# Patient Record
Sex: MALE | Race: OTHER
[De-identification: names, ages, dates, MRNs, and addresses within clinical notes are randomized per-mention and may not be internally consistent; named-entity substitution may affect disease eponyms.]

---

## 2020-05-23 VITALS — SYSTOLIC BLOOD PRESSURE: 121 MMHG | DIASTOLIC BLOOD PRESSURE: 71 MMHG

## 2020-09-29 ENCOUNTER — HOSPITAL ENCOUNTER (OUTPATIENT)
Dept: HOSPITAL 61 - ECHO | Age: 55
Discharge: HOME | End: 2020-09-29
Attending: INTERNAL MEDICINE
Payer: COMMERCIAL

## 2020-09-29 DIAGNOSIS — I70.203: ICD-10-CM

## 2020-09-29 DIAGNOSIS — Z89.612: ICD-10-CM

## 2020-09-29 DIAGNOSIS — I08.0: Primary | ICD-10-CM

## 2020-09-29 PROCEDURE — 93925 LOWER EXTREMITY STUDY: CPT

## 2020-09-29 PROCEDURE — 93306 TTE W/DOPPLER COMPLETE: CPT

## 2020-09-29 NOTE — RAD
MR#: [id number]

Account#: [id number]

Accession#: [id number]

Date of Study: 09/29/2020

Ordering Physician: TRACY BLAKELY, 

Referring Physician: TRACY BLAKELY, 

Tech: Dejan Stuart MBA, RDMS, RVT, RDCS, RTR





--------------- APPROVED REPORT --------------





Patient Location: OUT-PATIENT



Indications

PAD



VELOCITY AND DOPPLER WAVEFORM ANALYSIS

RIGHT cm/secWaveformSeverity LEFT cm/secWaveform Severity 

dCFA 106.0BiphasicdCFA 67.0Biphasic

Prof Fem Art. 74.0BiphasicProf Fem Art. 80.0Biphasic

Fem Art Prox. 98.0TriphasicFem Art Prox. 74.0Biphasic

Fem Art Mid. 102.0TriphasicFem Art Mid. 68.0Monophasic

Fem Art Dist. 98.0TriphasicFem Art Dist. 17.0Monophasic

Pop Art(Fossa) 84.0TriphasicPop Art(AK) 28.0Monophasic

PTA Prox. 29.0BiphasicPTA Prox. 

PTA Dist. 12.0MonophasicPTA Dist. 

REVA Prox. 71.0MonophasicATA Prox. 

DPA 55MonophasicDPA 



Image





Findings

Grayscale images demonstrate moderate diffuse plaque bilaterally in the lower extremity arterial vess
els.



On the right side there are mostly triphasic and biphasic waveforms from the common femoral artery to
 the popliteal segment.  Below the knee the peroneal artery is not visualized.  There are diminished 
but biphasic waveforms in the posterior tibial artery in the proximal segment.  The anterior tibial a
rtery velocities are within normal limits.  The waveforms below the knee are monophasic in nature.



On the left side no significant disease is noted from the common femoral artery to the proximal SFA. 
 Diminished velocities are noted in the SFA and popliteal artery and a monophasic wave pattern consis
tent with the prior below the knee amputation.



Critical Notification

Critical Value: No



<Conclusion>

1.  No significant high-grade stenosis identified on the right side above the knee.  The right perone
al artery is likely occluded.  Likely diffuse disease in the posterior tibial artery and no focal obs
truction noted in the anterior tibial artery on the left side.

2.  No significant disease noted in the left common femoral to SFA segment.  The patient has a prior 
left below the knee amputation



Signed by : Michael Tucker, 

Electronically Approved : 09/29/2020 12:56:56

## 2020-09-29 NOTE — CARD
MR#: [id number]

Account#: [id number]

Accession#: [id number]

Date of Study: 09/29/2020

Ordering Physician: TRACY BLAKELY, 

Referring Physician: TRACY BLAKELY, 

Tech: Mica Vernonaddison





--------------- APPROVED REPORT --------------





EXAM: Two-dimensional and M-mode echocardiogram with Doppler and color Doppler.



Other Information 

Quality : AverageHR: 76bpm



INDICATION

Cardiac Disease: CAD 



Surgery/Intervention

CABG: Date: 2018  Site: [facility]



RISK FACTORS

Hypertension 

Diabetes



2D DIMENSIONS 

RVDd3.4 (2.9-3.5cm)Left Atrium(2D)3.3 (1.6-4.0cm)

IVSd1.0 (0.7-1.1cm)Aortic Root(2D)3.3 (2.0-3.7cm)

LVDd4.6 (3.9-5.9cm)LVOT Diameter2.1 (1.8-2.4cm)

PWd1.0 (0.7-1.1cm)LVDs2.9 (2.5-4.0cm)

FS (%) 35.4 %SV61.8 ml

LVEF(%)64.9 (>50%)



Aortic Valve

AoV Peak Christophe.241.0cm/sAoV VTI48.6cm

AO Peak GR.23.2mmHgLVOT Peak Christophe.120.5cm/s

LVOT  VTI 26.52cmAO Mean GR.11mmHg

SIMON (VMAX)1.40qm5ATB   (VTI)1.97cm2



Mitral Valve

MV E Opvanujs984.9cm/sMV DECEL [id number]

MV A Sxytxupy983.6cm/sMV E Mean Gr.7mmHg

MV KQQ24izK/A  Ratio1.1

MVA (PHT)2.43cm2



TDI

E/Lateral E'23.3E/Medial E'47.5



Pulmonary Valve

PV Peak Ycqugbkg31.6cm/sPV Peak Grad.4mmHg



Tricuspid Valve

TR P. Njdvlqgr526hx/sRAP AFYIRRAK1dsAk

TR Peak Gr.87vnJaYJOY76vfKh



Pulmonary Vein

S1 Eyaiitxr81.8cm/sD2 Lfbajtdg04.7cm/s

PVa aoizpcsy230anrv



 LEFT VENTRICLE 

The left ventricle is normal size. There is normal left ventricular wall thickness. The left ventricu
lar systolic function is normal and the ejection fraction is within normal range. The Ejection Fracti
on is 50-55%. There is normal LV segmental wall motion. Transmitral Doppler flow pattern is Grade II-
pseudonormal filling dynamics.



 RIGHT VENTRICLE 

The right ventricle is normal size. There is normal right ventricular wall thickness. The right ventr
icular systolic function is normal.



 ATRIA 

The left atrium is borderline dilated. The right atrium size is normal. The interatrial septum is int
act with no evidence for an atrial septal defect or patent foramen ovale as noted on 2-D or Doppler i
maging.



 AORTIC VALVE 

The aortic valve is heavily calcified and displays decreased opening. Doppler and Color Flow revealed
 trace aortic regurgitation. Calculated aortic valve area is 2.14 cm2 with maximum pressure gradient 
of 25 mmHg and mean pressure gradient of 11 mmHg.



 MITRAL VALVE 

The mitral valve is moderately thickened and heavily calcified. There is no evidence of mitral valve 
prolapse. Mitral valve has a mean gradient of 7.1 mmHg. There is mild to moderate mitral valve stenos
is.



 TRICUSPID VALVE 

The tricuspid valve is normal in structure and function. Doppler and Color Flow revealed trace tricus
pid regurgitation with an estimated PAP of 36 mmHg. There is no tricuspid valve stenosis.



 PULMONIC VALVE 

The pulmonic valve is not well visualized. Doppler and Color Flow revealed no pulmonic valvular regur
gitation. There is no pulmonic valvular stenosis.



 GREAT VESSELS 

The aortic root is normal in size. The ascending aorta is normal in size. The IVC is normal in size a
nd collapses >50% with inspiration.



 PERICARDIAL EFFUSION 

There is no evidence of significant pericardial effusion.



Critical Notification

Critical Value: No



<Conclusion>

The left ventricular systolic function is normal and the ejection fraction is within normal range. Th
e Ejection Fraction is 50-55%.

There is normal LV segmental wall motion.

The aortic valve is heavily calcified and displays decreased opening.

Calculated aortic valve area is 2.14 cm2 with maximum pressure gradient of 25 mmHg and mean pressure 
gradient of 11 mmHg.

Mitral valve has a mean gradient of 7.1 mmHg.  There is mild to moderate mitral valve stenosis.



Signed by : Michael Tucker, 

Electronically Approved : 09/29/2020 11:54:18

## 2020-10-20 ENCOUNTER — HOSPITAL ENCOUNTER (EMERGENCY)
Dept: HOSPITAL 61 - ER | Age: 55
Discharge: HOME | End: 2020-10-20
Payer: COMMERCIAL

## 2020-10-20 VITALS
SYSTOLIC BLOOD PRESSURE: 109 MMHG | SYSTOLIC BLOOD PRESSURE: 109 MMHG | DIASTOLIC BLOOD PRESSURE: 59 MMHG | DIASTOLIC BLOOD PRESSURE: 59 MMHG

## 2020-10-20 VITALS — WEIGHT: 212.31 LBS | HEIGHT: 69 IN | BODY MASS INDEX: 31.44 KG/M2

## 2020-10-20 DIAGNOSIS — Z95.1: ICD-10-CM

## 2020-10-20 DIAGNOSIS — Z87.891: ICD-10-CM

## 2020-10-20 DIAGNOSIS — I25.2: ICD-10-CM

## 2020-10-20 DIAGNOSIS — E11.22: ICD-10-CM

## 2020-10-20 DIAGNOSIS — I12.0: ICD-10-CM

## 2020-10-20 DIAGNOSIS — E11.40: ICD-10-CM

## 2020-10-20 DIAGNOSIS — G89.29: ICD-10-CM

## 2020-10-20 DIAGNOSIS — N18.6: ICD-10-CM

## 2020-10-20 DIAGNOSIS — Z99.2: ICD-10-CM

## 2020-10-20 DIAGNOSIS — N48.89: Primary | ICD-10-CM

## 2020-10-20 PROCEDURE — 99281 EMR DPT VST MAYX REQ PHY/QHP: CPT

## 2020-10-20 NOTE — PHYS DOC
Past Medical History


Past Medical History:  Diabetes-Type II, Hypertension, MI, Renal Failure, Other


Additional Past Medical Histor:  MILD MI,ESRD,CHRONIC PAIN


Past Surgical History:  Coronary Bypass Surgery, Other


Additional Past Surgical Histo:  LT ARM DIALYSIS SHUNT,L lower  amputaion


Smoking Status:  Former Smoker


Alcohol Use:  None


Drug Use:  None





General Adult


EDM:


Chief Complaint:  PENIS PROBLEM





HPI:


HPI:





History obtained from patient.  Patient is a 55-year-old male with a history of 

diabetes who presents with chief complaint of glans penis discomfort.  He states

he has had this discomfort for the past week.  He states he was initially 

started on Econazole ointment proximate 1 week ago.  He states he was 

reevaluated 3 days ago and started on clotrimazole.  He states this is not 

helped his discomfort.  Denies any rash.  Denies any discharge.  Denies any 

dysuria.  Notes occasional discomfort to the dorsal aspect of the glans penis.  

Denies trauma or injury.  Eyes any rashes.  Denies testicular pain.  Is 

vomiting.  Eyes any history of STIs.  Does note that he still makes urine.  

Denies any urinary symptoms.  Denies swelling.  No other complaints.





Review of Systems:


Review of Systems:


Constitutional:   Denies fever or chills. []


Eyes:   Denies change in visual acuity. []


HENT:   Denies nasal congestion or sore throat. [] 


Respiratory:   Denies cough or shortness of breath. [] 


Cardiovascular:   Denies chest pain or edema. [] 


GI:   Denies abdominal pain, nausea, vomiting, bloody stools or diarrhea. [] 


: Positive for glans penis pain


Musculoskeletal:   Denies back pain or joint pain. [] 


Integument:   Denies rash. [] 


Neurologic:   Denies headache, focal weakness or sensory changes. [] 


Endocrine:   Denies polyuria or polydipsia. [] 


Lymphatic:  Denies swollen glands. [] 


Psychiatric:  Denies depression or anxiety. []





Heart Score:


Risk Factors:


Risk Factors:  DM, Current or recent (<one month) smoker, HTN, HLP, family 

history of CAD, obesity.


Risk Scores:


Score 0 - 3:  2.5% MACE over next 6 weeks - Discharge Home


Score 4 - 6:  20.3% MACE over next 6 weeks - Admit for Clinical Observation


Score 7 - 10:  72.7% MACE over next 6 weeks - Early Invasive Strategies





Allergies:


Allergies:





Allergies








Coded Allergies Type Severity Reaction Last Updated Verified


 


  No Known Drug Allergies    5/21/20 No











Physical Exam:


PE:





Constitutional: Well developed, well nourished, no acute distress, non-toxic 

appearance. []


HENT: Normocephalic, atraumatic, bilateral external ears normal, oropharynx 

moist, no oral exudates, nose normal. []


Eyes: PERRLA, EOMI, conjunctiva normal, no discharge. [] 


Neck: Normal range of motion, no tenderness, supple, no stridor. [] 


Cardiovascular:Heart rate regular rhythm, no murmur []


Lungs & Thorax:  Bilateral breath sounds clear to auscultation []


Abdomen:  soft, no tenderness, no masses, no pulsatile masses. [] 


: Uncircumcised.  No testicular tenderness palpation.  No signs of fungal rash

 appreciated.  No discharge noted.  No blood the external urethral meatus.  No 

reproducible tenderness.  Normal exam.


Skin: Warm, dry, no erythema, no rash. [] 


Back: No tenderness, no CVA tenderness. [] 


Extremities: No tenderness, no cyanosis, no clubbing, ROM intact, no edema. [] 


Neurologic: Alert and oriented X 3, normal motor function, normal sensory 

function, no focal deficits noted. []


Psychologic: Affect normal, judgement normal, mood normal. []





Current Patient Data:


Vital Signs:





                                   Vital Signs








  Date Time  Temp Pulse Resp B/P (MAP) Pulse Ox O2 Delivery O2 Flow Rate FiO2


 


10/20/20 15:37 98.4 72 20 109/59 (76) 97 Room Air  





 98.4       











EKG:


EKG:


[]





Radiology/Procedures:


Radiology/Procedures:


[]





Course & Med Decision Making:


Course & Med Decision Making


Pertinent Labs and Imaging studies reviewed. (See chart for details)





[] Patient is a well-appearing 55-year-old male who presents with chief 

complaint of penile discomfort.  Initial vital signs unremarkable.  Exam overall

 reassuring.  No signs of balanoposthitis.  No penile discharge appreciated.  No

 testicular pain.  Overall reassuring examination.  Urine sample was attempted 

to be obtained but patient was unable to provide sample given his history of 

ESRD.  At this time elicitation for emergent etiology.  He does have follow-up 

appoint with his primary care physician tomorrow.  Imaging and lab will be 

deferred.  Return precautions were discussed and understood.  Patient agreeable 

to this plan.  Instructed to continue to use his clotrimazole ointment at home. 

 Stable for discharge.





Dragon Disclaimer:


Dragon Disclaimer:


This electronic medical record was generated, in whole or in part, using a voice

 recognition dictation system.





Departure


Departure


Impression:  


   Primary Impression:  


   Penile pain


Disposition:  01 DC HOME SELF CARE/HOMELESS


Condition:  STABLE


Referrals:  


ARIC MATUTE MD (PCP)


Patient Instructions:  Balanitis, Balanitis and Foreskin Hygiene





Additional Instructions:  


Please follow-up with your primary care physician in the next 2 to 3 days.











GURU CESAR DO          Oct 20, 2020 17:20

## 2021-11-23 ENCOUNTER — HOSPITAL ENCOUNTER (OUTPATIENT)
Dept: HOSPITAL 61 - ECHO | Age: 56
End: 2021-11-23
Attending: INTERNAL MEDICINE
Payer: COMMERCIAL

## 2021-11-23 DIAGNOSIS — E11.9: ICD-10-CM

## 2021-11-23 DIAGNOSIS — E66.9: ICD-10-CM

## 2021-11-23 DIAGNOSIS — E78.5: ICD-10-CM

## 2021-11-23 DIAGNOSIS — I10: ICD-10-CM

## 2021-11-23 DIAGNOSIS — I73.9: ICD-10-CM

## 2021-11-23 DIAGNOSIS — I70.203: ICD-10-CM

## 2021-11-23 DIAGNOSIS — I25.10: ICD-10-CM

## 2021-11-23 DIAGNOSIS — I08.0: Primary | ICD-10-CM

## 2021-11-23 PROCEDURE — 93306 TTE W/DOPPLER COMPLETE: CPT

## 2021-11-23 PROCEDURE — 93925 LOWER EXTREMITY STUDY: CPT

## 2021-11-24 NOTE — CARD
MR#: [id number]

Account#: [id number]

Accession#: [id number]

Date of Study: 11/23/2021

Ordering Physician: TRACY BLAKELY, 

Referring Physician: TRACY BLAKELY 

Tech: Neli [facility]





--------------- APPROVED REPORT --------------





EXAM: Two-dimensional and M-mode echocardiogram with Doppler and color Doppler.



Other Information 

Quality : Technically LimitedHR: 70bpm

Rhythm : NSR



INDICATION

Cardiac Disease: CAD 



RISK FACTORS

Hypertension 

Obesity   

Hyperlipidemia

Diabetes



2D DIMENSIONS 

RVDd3.9 (2.9-3.5cm)Left Atrium(2D)3.7 (1.6-4.0cm)

IVSd1.1 (0.7-1.1cm)Aortic Root(2D)3.6 (2.0-3.7cm)

LVDd4.1 (3.9-5.9cm)LVOT Diameter2.2 (1.8-2.4cm)

PWd1.3 (0.7-1.1cm)LVDs2.8 (2.5-4.0cm)

FS (%) 31.7 %SV44.9 ml

LVEF(%)60.1 (>50%)



Aortic Valve

AoV Peak Christophe.248.0cm/sAoV VTI54.2cm

AO Peak GR.24.6mmHgLVOT Peak Christophe.103.2cm/s

AO Mean GR.13mmHgAVA (VMAX)1.62cm2



Mitral Valve

MV E Vnybmagg559.0cm/sMV E Peak Gr.10mmHg

MV DECEL [id number] A Yhessnxn330.1cm/s

MV E Mean Gr.5mmHgE/A  Ratio1.0



Pulmonary Valve

PV Peak Oxifvxst967.0cm/s



 LEFT VENTRICLE 

The left ventricle is normal size. There is mild concentric left ventricular hypertrophy. The left ve
ntricular systolic function is normal.  Estimated ejection fraction 55-60%. There is normal LV segmen
lindsay wall motion. Transmitral Doppler flow pattern is Grade II-pseudonormal filling dynamics.



 RIGHT VENTRICLE 

The right ventricle is normal size. There is normal right ventricular wall thickness. The right ventr
icular systolic function is normal.



 ATRIA 

The left atrium size is normal. The right atrium size is normal. The interatrial septum is intact wit
h no evidence for an atrial septal defect or patent foramen ovale as noted on 2-D or Doppler imaging.




 AORTIC VALVE 

The aortic valve is calcified and displays decreased opening. Doppler and Color Flow revealed no sign
ificant aortic regurgitation. There is mild valvular aortic stenosis.



 MITRAL VALVE 

The mitral valve is calcified and displays decreased opening. There is no evidence of mitral valve pr
olapse. There is mild to moderate mitral valve stenosis. Doppler and Color-flow revealed trace mitral
 regurgitation.



 TRICUSPID VALVE 

The tricuspid valve is normal in structure and function. Doppler and Color Flow revealed no tricuspid
 valve regurgitation noted. There is no tricuspid valve stenosis.



 PULMONIC VALVE 

The pulmonary valve is normal in structure and function. Doppler and Color Flow revealed no pulmonic 
valvular regurgitation.



 GREAT VESSELS 

The aortic root is normal in size. The ascending aorta is normal in size. The IVC is normal in size a
nd collapses >50% with inspiration.



 PERICARDIAL EFFUSION 

There is no evidence of significant pericardial effusion.



Critical Notification

Critical Value: No



<Conclusion>

The left ventricular systolic function is normal.  

Estimated ejection fraction 55-60%.

There is normal LV segmental wall motion.

Transmitral Doppler flow pattern is Grade II-pseudonormal filling dynamics.

Mild valvular aortic stenosis.

Mild to moderate mitral valve stenosis.

There is no evidence of significant pericardial effusion.



Signed by : Tracy Blakely, 

Electronically Approved : 11/24/2021 08:15:22

## 2021-11-24 NOTE — RAD
MR#: [id number]

Account#: [id number]

Accession#: [id number]

Date of Study: 11/23/2021

Ordering Physician: TRACY VINSON, 

Referring Physician: TRACY VINSON 

Tech: Ora Sparrow RVT, HOOD





--------------- APPROVED REPORT --------------





Patient Location: OUT-PATIENT



Indications

PAD



VELOCITY AND DOPPLER WAVEFORM ANALYSIS

RIGHT cm/secWaveformSeverity LEFT cm/secWaveform Severity 

dCFA 147.0TriphasicdCFA 67.0Monophasic

Prof Fem Art. 124.0BiphasicProf Fem Art. 79.0Monophasic

Fem Art Prox. 140.0TriphasicFem Art Prox. 114.0Monophasic

Fem Art Mid. 58.0TriphasicFem Art Mid. 37.0Monophasic

Fem Art Dist. 51.0TriphasicFem Art Dist. 12.0Monophasic

Pop Art(Fossa) 68.0TriphasicPop Art(AK) 10.0Monophasic

PTA Prox. 48.0MonophasicPTA Prox. 

PTA Dist. 29.0MonophasicPTA Dist. 

Per Art Dist.41.0MonophasicPer Art Dist.

REVA Prox. 37.0MonophasicATA Prox. 

DPA 96MonophasicDPA 



Findings

Grayscale images showed calcified moderate and diffuse atherosclerotic plaque in both lower extremity
 vessels.  Spectral waveform and color duplex analysis showed triphasic waveforms in the right lower 
extremity from common femoral to the popliteal artery.  Below the knee, there is three-vessel runoff 
with monophasic waveforms.  The right posterior tibial artery showed minimal flow probably from moder
ate diffuse disease.  No focal high-grade stenosis was noted.  On the left side, no significant steno
sis was noted from common femoral artery to the popliteal artery.  There were diminished velocities i
n the superficial femoral and popliteal arteries with monophasic wave forms consistent with prior bel
ow the knee amputation.



Critical Notification

Critical Value: No



<Conclusion>

No significant high-grade peripheral artery stenosis was noted bilaterally.  The right posterior tibi
al artery showed moderate diffuse disease.  Patient has known left below the knee amputation.



Signed by : Tracy Vinson, 

Electronically Approved : 11/24/2021 08:29:49

## 2022-02-07 ENCOUNTER — HOSPITAL ENCOUNTER (INPATIENT)
Dept: HOSPITAL 61 - ER | Age: 57
LOS: 1 days | Discharge: HOME | DRG: 391 | End: 2022-02-08
Attending: FAMILY MEDICINE | Admitting: FAMILY MEDICINE
Payer: COMMERCIAL

## 2022-02-07 VITALS — SYSTOLIC BLOOD PRESSURE: 103 MMHG | DIASTOLIC BLOOD PRESSURE: 51 MMHG

## 2022-02-07 VITALS — HEIGHT: 69 IN | WEIGHT: 219.14 LBS | BODY MASS INDEX: 32.46 KG/M2

## 2022-02-07 VITALS — DIASTOLIC BLOOD PRESSURE: 27 MMHG | SYSTOLIC BLOOD PRESSURE: 101 MMHG

## 2022-02-07 VITALS — DIASTOLIC BLOOD PRESSURE: 50 MMHG | SYSTOLIC BLOOD PRESSURE: 109 MMHG

## 2022-02-07 VITALS — DIASTOLIC BLOOD PRESSURE: 62 MMHG | SYSTOLIC BLOOD PRESSURE: 115 MMHG

## 2022-02-07 DIAGNOSIS — E11.51: ICD-10-CM

## 2022-02-07 DIAGNOSIS — I77.1: ICD-10-CM

## 2022-02-07 DIAGNOSIS — Z79.4: ICD-10-CM

## 2022-02-07 DIAGNOSIS — E11.22: ICD-10-CM

## 2022-02-07 DIAGNOSIS — D63.8: ICD-10-CM

## 2022-02-07 DIAGNOSIS — I25.10: ICD-10-CM

## 2022-02-07 DIAGNOSIS — Z87.891: ICD-10-CM

## 2022-02-07 DIAGNOSIS — M79.605: ICD-10-CM

## 2022-02-07 DIAGNOSIS — Z98.62: ICD-10-CM

## 2022-02-07 DIAGNOSIS — Z20.822: ICD-10-CM

## 2022-02-07 DIAGNOSIS — N18.6: ICD-10-CM

## 2022-02-07 DIAGNOSIS — Z95.1: ICD-10-CM

## 2022-02-07 DIAGNOSIS — Z99.2: ICD-10-CM

## 2022-02-07 DIAGNOSIS — E78.5: ICD-10-CM

## 2022-02-07 DIAGNOSIS — I42.9: ICD-10-CM

## 2022-02-07 DIAGNOSIS — Z79.82: ICD-10-CM

## 2022-02-07 DIAGNOSIS — I25.2: ICD-10-CM

## 2022-02-07 DIAGNOSIS — J44.9: ICD-10-CM

## 2022-02-07 DIAGNOSIS — Z89.512: ICD-10-CM

## 2022-02-07 DIAGNOSIS — Z82.49: ICD-10-CM

## 2022-02-07 DIAGNOSIS — Z83.3: ICD-10-CM

## 2022-02-07 DIAGNOSIS — I12.0: ICD-10-CM

## 2022-02-07 DIAGNOSIS — K21.9: Primary | ICD-10-CM

## 2022-02-07 DIAGNOSIS — E21.3: ICD-10-CM

## 2022-02-07 LAB
ALBUMIN SERPL-MCNC: 3.5 G/DL (ref 3.4–5)
ALBUMIN/GLOB SERPL: 0.8 {RATIO} (ref 1–1.7)
ALP SERPL-CCNC: 57 U/L (ref 46–116)
ALT SERPL-CCNC: 19 U/L (ref 16–63)
ANION GAP SERPL CALC-SCNC: 20 MMOL/L (ref 6–14)
APTT BLD: 33 SEC (ref 24–38)
AST SERPL-CCNC: 13 U/L (ref 15–37)
BASOPHILS # BLD AUTO: 0.1 X10^3/UL (ref 0–0.2)
BASOPHILS NFR BLD: 1 % (ref 0–3)
BILIRUB SERPL-MCNC: 0.2 MG/DL (ref 0.2–1)
BUN SERPL-MCNC: 67 MG/DL (ref 8–26)
BUN/CREAT SERPL: 8 (ref 6–20)
CALCIUM SERPL-MCNC: 8.6 MG/DL (ref 8.5–10.1)
CHLORIDE SERPL-SCNC: 97 MMOL/L (ref 98–107)
CO2 SERPL-SCNC: 23 MMOL/L (ref 21–32)
CREAT SERPL-MCNC: 8.1 MG/DL (ref 0.7–1.3)
CRP SERPL-MCNC: 8.8 MG/L (ref 0–3.3)
EOSINOPHIL NFR BLD: 0.3 X10^3/UL (ref 0–0.7)
EOSINOPHIL NFR BLD: 3 % (ref 0–3)
ERYTHROCYTE [DISTWIDTH] IN BLOOD BY AUTOMATED COUNT: 14.7 % (ref 11.5–14.5)
GFR SERPLBLD BASED ON 1.73 SQ M-ARVRAT: 6.9 ML/MIN
GLUCOSE SERPL-MCNC: 109 MG/DL (ref 70–99)
HCT VFR BLD CALC: 23.7 % (ref 39–53)
HGB BLD-MCNC: 7.8 G/DL (ref 13–17.5)
LYMPHOCYTES # BLD: 2.9 X10^3/UL (ref 1–4.8)
LYMPHOCYTES NFR BLD AUTO: 29 % (ref 24–48)
MAGNESIUM SERPL-MCNC: 2.2 MG/DL (ref 1.8–2.4)
MCH RBC QN AUTO: 31 PG (ref 25–35)
MCHC RBC AUTO-ENTMCNC: 33 G/DL (ref 31–37)
MCV RBC AUTO: 94 FL (ref 79–100)
MONO #: 0.9 X10^3/UL (ref 0–1.1)
MONOCYTES NFR BLD: 9 % (ref 0–9)
NEUT #: 5.9 X10^3/UL (ref 1.8–7.7)
NEUTROPHILS NFR BLD AUTO: 59 % (ref 31–73)
PLATELET # BLD AUTO: 217 X10^3/UL (ref 140–400)
POTASSIUM SERPL-SCNC: 4.1 MMOL/L (ref 3.5–5.1)
PROT SERPL-MCNC: 7.8 G/DL (ref 6.4–8.2)
PROTHROMBIN TIME: 13 SEC (ref 11.7–14)
RBC # BLD AUTO: 2.52 X10^6/UL (ref 4.3–5.7)
SODIUM SERPL-SCNC: 140 MMOL/L (ref 136–145)
WBC # BLD AUTO: 10 X10^3/UL (ref 4–11)

## 2022-02-07 PROCEDURE — 87426 SARSCOV CORONAVIRUS AG IA: CPT

## 2022-02-07 PROCEDURE — 85651 RBC SED RATE NONAUTOMATED: CPT

## 2022-02-07 PROCEDURE — 87340 HEPATITIS B SURFACE AG IA: CPT

## 2022-02-07 PROCEDURE — 80053 COMPREHEN METABOLIC PANEL: CPT

## 2022-02-07 PROCEDURE — 83880 ASSAY OF NATRIURETIC PEPTIDE: CPT

## 2022-02-07 PROCEDURE — 93005 ELECTROCARDIOGRAM TRACING: CPT

## 2022-02-07 PROCEDURE — 80048 BASIC METABOLIC PNL TOTAL CA: CPT

## 2022-02-07 PROCEDURE — 85025 COMPLETE CBC W/AUTO DIFF WBC: CPT

## 2022-02-07 PROCEDURE — 82962 GLUCOSE BLOOD TEST: CPT

## 2022-02-07 PROCEDURE — G0378 HOSPITAL OBSERVATION PER HR: HCPCS

## 2022-02-07 PROCEDURE — 75635 CT ANGIO ABDOMINAL ARTERIES: CPT

## 2022-02-07 PROCEDURE — 85610 PROTHROMBIN TIME: CPT

## 2022-02-07 PROCEDURE — 83735 ASSAY OF MAGNESIUM: CPT

## 2022-02-07 PROCEDURE — 85730 THROMBOPLASTIN TIME PARTIAL: CPT

## 2022-02-07 PROCEDURE — U0003 INFECTIOUS AGENT DETECTION BY NUCLEIC ACID (DNA OR RNA); SEVERE ACUTE RESPIRATORY SYNDROME CORONAVIRUS 2 (SARS-COV-2) (CORONAVIRUS DISEASE [COVID-19]), AMPLIFIED PROBE TECHNIQUE, MAKING USE OF HIGH THROUGHPUT TECHNOLOGIES AS DESCRIBED BY CMS-2020-01-R: HCPCS

## 2022-02-07 PROCEDURE — 96374 THER/PROPH/DIAG INJ IV PUSH: CPT

## 2022-02-07 PROCEDURE — 73562 X-RAY EXAM OF KNEE 3: CPT

## 2022-02-07 PROCEDURE — 86140 C-REACTIVE PROTEIN: CPT

## 2022-02-07 PROCEDURE — 5A1D70Z PERFORMANCE OF URINARY FILTRATION, INTERMITTENT, LESS THAN 6 HOURS PER DAY: ICD-10-PCS | Performed by: FAMILY MEDICINE

## 2022-02-07 PROCEDURE — 93926 LOWER EXTREMITY STUDY: CPT

## 2022-02-07 PROCEDURE — 36415 COLL VENOUS BLD VENIPUNCTURE: CPT

## 2022-02-07 PROCEDURE — 84484 ASSAY OF TROPONIN QUANT: CPT

## 2022-02-07 PROCEDURE — 71045 X-RAY EXAM CHEST 1 VIEW: CPT

## 2022-02-07 PROCEDURE — 84145 PROCALCITONIN (PCT): CPT

## 2022-02-07 PROCEDURE — G0379 DIRECT REFER HOSPITAL OBSERV: HCPCS

## 2022-02-07 PROCEDURE — 93971 EXTREMITY STUDY: CPT

## 2022-02-07 RX ADMIN — OXYCODONE HYDROCHLORIDE AND ACETAMINOPHEN SCH TAB: 10; 325 TABLET ORAL at 21:36

## 2022-02-07 RX ADMIN — GABAPENTIN SCH MG: 300 CAPSULE ORAL at 21:35

## 2022-02-07 NOTE — PHYS DOC
Past Medical History


Past Medical History:  Diabetes-Type II, Hypertension, MI, Renal Failure, Other


Additional Past Medical Histor:  PERIPHERAL NEUROPATHY, CARDIOMYOPATHY, PVD, 

ULCERS, DIALYSIS MWF


Past Surgical History:  Coronary Bypass Surgery


Additional Past Surgical Histo:  L BKA, STENT R LEG, AV SHUNT R FA,


Smoking Status:  Former Smoker


Alcohol Use:  None


Drug Use:  None





Adult General


Chief Complaint


Chief Complaint:  CHEST PAIN





HPI


HPI


The patient is a 56-year-old comorbid male with a history of hypertension, 

hyperlipidemia, coronary artery disease status post CABG, peripheral vascular 

disease status post left BKA, insulin-dependent diabetes, end-stage renal 

disease on hemodialysis MWF (last dialyzed Friday, three days ago, on schedule).

 He presents for evaluation of 2 concerns.





First, patient notes left leg pain at his BKA site with onset 4-5 days ago.  

Discomfort was initially present only with bearing weight or with ambulating but

overnight became more constant and has been present at rest as well.  He states 

he has been able to ambulate with his prosthesis despite the leg discomfort.





Second, patient notes midsternal nonradiating chest discomfort with onset at 

about 5 AM this morning, about 1.5 hours prior to arrival.  Discomfort is 

characterized as constant, nonexertional and nonpleuritic and "like heartburn." 

Severity about 6 out of 10 at present.





Patient denies associated fevers, nausea or vomiting, upper respiratory con

gestion/rhinorrhea, cough, sore throat, shortness of breath, abdominal pain of 

any kind, flank pain, midline back pain, dysuria, hematuria, polyuria or 

oliguria, changes in bowel habits.  Vital signs are appropriate here and the 

patient is in no acute distress.  Triage EKG is nonischemic.





Review of Systems


Review of Systems


A 12 point review of systems was completed and was negative except for noted in 

HPI above.





Current Medications


Current Medications





Current Medications








 Medications


  (Trade)  Dose


 Ordered  Sig/Yisel  Start Time


 Stop Time Status Last Admin


Dose Admin


 


 Aspirin


  (Myles Aspirin)  325 mg  1X  ONCE  2/7/22 07:00


 2/7/22 07:01 DC 2/7/22 07:20


325 MG


 


 Fentanyl Citrate


  (Fentanyl 2ml


 Vial)  50 mcg  1X  ONCE  2/7/22 07:00


 2/7/22 07:01 DC 2/7/22 07:21


50 MCG











Allergies


Allergies





Allergies








Coded Allergies Type Severity Reaction Last Updated Verified


 


  No Known Drug Allergies    5/21/20 No











Physical Exam


Physical Exam


Older male appearing nontoxic and in no acute distress.  Head is normocephalic 

and atraumatic.  Neck is supple and nontender.  Oropharynx is moist.  Lungs are 

clear to auscultation at all stations.  There is a normal S1 and S2 without rubs

or gallops and capillary refill is appropriate, less than 2 seconds globally.  

There is a loud systolic ejection murmur which patient states is not new.  

Abdomen is soft, nontender and nondistended without pulsatile mass.  Skin is 

warm and dry without cyanosis, clubbing or edema.  Psychiatrically, the patient 

demonstrates appropriate mood and affect and is alert.  Evaluation of the 

extremities reveals BUEs and BLEs neurovascularly intact distally with strength 

5 out of 5, sensation intact light touch in all nerve distributions, radial, DP 

and PT pulses 2+ and equal bilaterally, capillary refill less than 2 seconds, 

hands and foot warm and well-perfused.  No dependent peripheral edema distally. 

No calf tenderness or swelling bilaterally.  Homans test is negative 

bilaterally.  Left below-knee amputation with very mild erythema and warmth and 

tenderness noted to the most distal aspect of the stump site and extending up 

slightly from there almost to the level of the knee both anteriorly and 

posteriorly.





Current Patient Data


Vital Signs





                                   Vital Signs








  Date Time  Temp Pulse Resp B/P (MAP) Pulse Ox O2 Delivery O2 Flow Rate FiO2


 


2/7/22 07:21   19  96 Room Air  


 


2/7/22 06:33 98.4 85  141/72 (95)    





 98.4       








Lab Values





                                Laboratory Tests








Test


 2/7/22


06:42


 


White Blood Count


 10.0 x10^3/uL


(4.0-11.0)


 


Red Blood Count


 2.52 x10^6/uL


(4.30-5.70)  L


 


Hemoglobin


 7.8 g/dL


(13.0-17.5)  L


 


Hematocrit


 23.7 %


(39.0-53.0)  L


 


Mean Corpuscular Volume


 94 fL ()





 


Mean Corpuscular Hemoglobin 31 pg (25-35)  


 


Mean Corpuscular Hemoglobin


Concent 33 g/dL


(31-37)


 


Red Cell Distribution Width


 14.7 %


(11.5-14.5)  H


 


Platelet Count


 217 x10^3/uL


(140-400)


 


Neutrophils (%) (Auto) 59 % (31-73)  


 


Lymphocytes (%) (Auto) 29 % (24-48)  


 


Monocytes (%) (Auto) 9 % (0-9)  


 


Eosinophils (%) (Auto) 3 % (0-3)  


 


Basophils (%) (Auto) 1 % (0-3)  


 


Neutrophils # (Auto)


 5.9 x10^3/uL


(1.8-7.7)


 


Lymphocytes # (Auto)


 2.9 x10^3/uL


(1.0-4.8)


 


Monocytes # (Auto)


 0.9 x10^3/uL


(0.0-1.1)


 


Eosinophils # (Auto)


 0.3 x10^3/uL


(0.0-0.7)


 


Basophils # (Auto)


 0.1 x10^3/uL


(0.0-0.2)


 


Erythrocyte Sedimentation Rate 67 (0-15)  H


 


Prothrombin Time


 13.0 SEC


(11.7-14.0)


 


Prothrombin Time INR 1.0 (0.8-1.1)  


 


Activated Partial


Thromboplast Time 33 SEC (24-38)





 


Sodium Level


 140 mmol/L


(136-145)


 


Potassium Level


 4.1 mmol/L


(3.5-5.1)


 


Chloride Level


 97 mmol/L


()  L


 


Carbon Dioxide Level


 23 mmol/L


(21-32)


 


Anion Gap 20 (6-14)  H


 


Blood Urea Nitrogen


 67 mg/dL


(8-26)  H


 


Creatinine


 8.1 mg/dL


(0.7-1.3)  H


 


Estimated GFR


(Cockcroft-Gault) 6.9  





 


BUN/Creatinine Ratio 8 (6-20)  


 


Glucose Level


 109 mg/dL


(70-99)  H


 


Calcium Level


 8.6 mg/dL


(8.5-10.1)


 


Magnesium Level


 2.2 mg/dL


(1.8-2.4)


 


Total Bilirubin


 0.2 mg/dL


(0.2-1.0)


 


Aspartate Amino Transferase


(AST) 13 U/L (15-37)


L


 


Alanine Aminotransferase (ALT)


 19 U/L (16-63)





 


Alkaline Phosphatase


 57 U/L


()


 


Troponin I High Sensitivity 9 ng/L (4-75)  


 


C-Reactive Protein,


Quantitative 8.8 mg/L


(0-3.3)  H


 


NT-Pro-B-Type Natriuretic


Peptide 4558 pg/mL


(0-124)  H


 


Total Protein


 7.8 g/dL


(6.4-8.2)


 


Albumin


 3.5 g/dL


(3.4-5.0)


 


Albumin/Globulin Ratio


 0.8 (1.0-1.7)


L


 


Procalcitonin


 0.41 ng/mL


(0.00-0.10)  H





                                Laboratory Tests


2/7/22 06:42








                                Laboratory Tests


2/7/22 06:42











EKG


EKG


Sinus rhythm, rate 80, no acute ST elevation or depression, , QRS 94, QTc 

444, EP interpretation.  Nonischemic tracing, intervals appropriate.





Radiology/Procedures


Radiology/Procedures


[]





Course & Med Decision Making


Course & Med Decision Making


56-year-old gentleman presenting for evaluation of left lower extremity stump 

discomfort with onset a few days ago, now with some chest discomfort 

midsternally as well.  Will place IV and give medication for discomfort as well 

as a full-strength aspirin as noted and will check labs, EKG and chest x-ray and

 will then reevaluate.  We will further assess for causes of acute leg pain with

 Doppler studies as noted.  Patient is due for dialysis today and in fact missed

 his session to come here.  Anticipate the need for admission, for multiple 

reasons.  This is discussed with the patient who is in agreement.





0814: On reassessment patient's left lower extremity stump remains tender but is

 no longer erythematous; suspect that there was some irritation from his 

prosthesis at the contact site when his leg was initially evaluated.  No 

evidence of cellulitis or other acute infectious process by labs, clinical exam 

or imaging upon reassessment.  Will defer antibiotics for now.  Workup as above;

 EKG and initial HS-trop negative.  Patient appears somewhat uremic but is due 

for dialysis today.  Electrolytes look okay.  Doppler studies notable for 

monophasic waveforms to the distal LLE; will order CT angiography with runoff to

 further characterize.  Patient's presenting LLE discomfort is suspicious, in 

context, for rest pain.  Plan for patient to dialyze shortly after CT angiogr

aphy is completed.  Will bring in for further care under Dr. Bowie (covering for 

Dr. Goff), who graciously accepts.  Nephrology consulted for assistance with 

dialysis today.





Dragon Disclaimer


Dragon Disclaimer


This electronic medical record was generated, in whole or in part, using a voice

 recognition dictation system.





Departure


Departure


Impression:  


   Primary Impression:  


   Other chest pain


   Additional Impressions:  


   Acute pain of left lower extremity


   Uremia


Disposition:  09 ADMITTED AS INPATIENT


Condition:  STABLE


Referrals:  


ARIC GOFF MD (PCP)





Problem Qualifiers











CHANTELLE TRENT MD                 Feb 7, 2022 07:09

## 2022-02-07 NOTE — RAD
3 views of the left knee dated 2/7/2022.



COMPARISON: None.



INDICATION: Chest pain.



FINDINGS:



3 views of left knee show evidence of prior below-knee amputation. No periostitis or bone destruction
. Mild tricompartmental hypertrophic changes of the knee. No joint effusion or loose body. Diffuse va
scular calcinosis.



IMPRESSION:

1. No acute radiographic abnormality.

2. Status post below-knee amputation.

3. Mild tricompartmental DJD with vascular calcinosis.



Electronically signed by: Jim Patricio MD (2/7/2022 7:29 AM) AMY

## 2022-02-07 NOTE — RAD
Single view chest dated 2/7/2022 7:27 AM:



COMPARISON: 5/21/2020



Clinical Indication: Chest pain.



Findings:



Single upright portable exam of the chest was performed. Heart and mediastinal contours are stable. P
atient is status post median sternotomy. Lungs are clear. No consolidation or pleural effusion. No pn
eumothorax.



IMPRESSION:



No acute radiographic abnormality.



Electronically signed by: Jim Patricio MD (2/7/2022 7:28 AM) AMY

## 2022-02-07 NOTE — RAD
US LEFT LOWER EXTREMITY ARTERIAL DUPLEX EVAL



Indication: Reason: acute left leg pain @ BKA site / Spl. Instructions:  / History:  



Comparison: None.



Procedure: Real-time grayscale, color flow Doppler, and Doppler spectral waveform analysis of the art
erial system of the lower extremity is performed.



Findings: 

Left below the knee amputation. Biphasic waveforms throughout the remainder of the left lower extremi
ty. No significant velocity elevation. Mild atheromatous plaque. Decreased flow within the left dista
l superficial femoral artery. No evidence of pseudoaneurysm within the imaged arteries.



IMPRESSION:

1.  Left below-the-knee amputation.

2.  Monophasic waveforms within the remainder of the left lower extremity, may indicate proximal sten
osis.

3.  Decreased velocity within the left distal superficial femoral artery.



Electronically signed by: Bean Carver DO (2/7/2022 7:59 AM) [id number]

## 2022-02-07 NOTE — RAD
US DPLX VENOUS EXTREMITY LOWER LT



History: Reason: acute left leg pain @ BKA site / Spl. Instructions:  / History: 



Comparison:  None.



Technique: Multiple longitudinal and transverse high resolution real-time images of the venous system
 of left lower extremity were obtained with color and Doppler sampling. 



Findings: 

Left below-the-knee amputation. Patent left common femoral, superficial femoral, deep femoral, poplit
eal and greater saphenous vein.



Impression: 

1.  No evidence of deep vein thrombosis.



Electronically signed by: Bean Carver DO (2/7/2022 7:57 AM) [id number]

## 2022-02-07 NOTE — PDOC2
CONSULT


Date of Consult


Date of Consult


DATE: 2/7/22 


TIME: 16:41





Reason for Consult


Reason for Consult:


ESRD





Referring Physician


Referring Physician:


MARSH





Identification/Chief Complaint


Chief Complaint


CHEST PAIN AND STUMP PAIN





Source


Source:  Chart review





History of Present Illness


Reason for Visit:


THIS IS A 56 YR OLD ESRD PT WITH CHEST PAIN AND LEFT BKA STUMP PAIN. UNDERGOING 

EVALUATION BY CARDIOLOGY AND VASCULAR SURGERY. HE HAS OP HD ON MWF AND HAS A 

LEFT FA AVF FOR ACCESS. ESRD IS DUE TO DM II.





Past Medical History


Cardiovascular:  CAD, HTN, Hyperlipidemia, Other (PAD s/p PTA RLE and left BKA)


Pulmonary:  COPD


GI:  Constipation


Heme/Onc:  Anemia NOS


Hepatobiliary:  No pertinent hx


Psych:  No pertinent hx


Rheumatologic:  No pertinent hx


Infectious disease:  No pertinent hx


Renal/:  Chronic renal failure (ESRD on HD )


Endocrine:  Diabetes





Past Surgical History


Past Surgical History:  CABG





Family History


Family History:  Diabetes, Hypertension





Social History


No


ALCOHOL:  none


Drugs:  None


Lives:  with Family





Current Problem List


Problem List


Problems


Medical Problems:


(1) Acute pain of left lower extremity


Status: Acute  





(2) Other chest pain


Status: Acute  





(3) Uremia


Status: Acute  











Current Medications


Current Medications





Current Medications


Aspirin (Myles Aspirin) 325 mg 1X  ONCE PO  Last administered on 2/7/22at 07:20;

 Start 2/7/22 at 07:00;  Stop 2/7/22 at 07:01;  Status DC


Fentanyl Citrate (Fentanyl 2ml Vial) 50 mcg 1X  ONCE IVP  Last administered on 

2/7/22at 07:21;  Start 2/7/22 at 07:00;  Stop 2/7/22 at 07:01;  Status DC


Ondansetron HCl (Zofran) 4 mg PRN Q8HRS  PRN IVP NAUSEA/VOMITING;  Start 2/7/22 

at 08:45;  Stop 2/8/22 at 08:44


Fentanyl Citrate (Fentanyl 2ml Vial) 25 mcg PRN Q1HR  PRN IVP PAIN;  Start 

2/7/22 at 08:45;  Stop 2/8/22 at 08:44


Iohexol (Omnipaque 350 Mg/ml) 80 ml 1X  ONCE IV  Last administered on 2/7/22at 

09:00;  Start 2/7/22 at 09:00;  Stop 2/7/22 at 09:01;  Status DC


Info (CONTRAST GIVEN -- Rx MONITORING) 1 each PRN DAILY  PRN MC SEE COMMENTS;  

Start 2/7/22 at 09:00;  Stop 2/9/22 at 08:59


Sodium Chloride 1,000 ml @  1,000 mls/hr Q1H PRN IV hypotension;  Start 2/7/22 

at 11:45;  Stop 2/7/22 at 17:44


Albumin Human 200 ml @  200 mls/hr 1X PRN  PRN IV Hypotension;  Start 2/7/22 at 

11:45;  Stop 2/7/22 at 17:44


Sodium Chloride 1,000 ml @  400 mls/hr Q2H30M PRN IV PATENCY;  Start 2/7/22 at 

11:45;  Stop 2/7/22 at 23:44


Info (PHARMACY MONITORING -- do not chart) 1 each PRN DAILY  PRN MC SEE 

COMMENTS;  Start 2/7/22 at 11:45


Info (PHARMACY MONITORING -- do not chart) 1 each PRN DAILY  PRN MC SEE 

COMMENTS;  Start 2/7/22 at 11:45


Aspirin (Myles Aspirin) 325 mg DAILY PO ;  Start 2/8/22 at 09:00


Atorvastatin Calcium (Lipitor) 20 mg QHS PO ;  Start 2/7/22 at 21:00





Active Scripts


Active


Augmentin 875-125 Tablet (Amoxicillin/Potassium Clav) 1 Each Tablet 1 Tab PO BID

PRN 10 Days


Azithromycin Tablet (Azithromycin) 250 Mg Tablet 250 Mg PO DAILY 4 Days


Gabapentin ** (Gabapentin) 300 Mg Capsule 300 Mg PO DAILY


Reported


Furosemide 40 Mg Tablet 40  DAILY


Gabapentin 300 Mg Capsule 300  BID


Trazodone Hcl 50 Mg Tablet 50  HS


Pantoprazole Sodium 40 Mg Tablet.dr 40  DAILY


Renagel (Sevelamer Hcl) 800 Mg Tablet 800  TID


Oxycodone-Acetaminophen  (Oxycodone Hcl/Acetaminophen) 1 Each Tablet 10  

QID


Atorvastatin Calcium 20 Mg Tablet 20  DAILY


Amlodipine Besylate 2.5 Mg Tablet 2.5  DAILY


Aspirin 325 Mg Tablet 1 Tab PO DAILY


Alprazolam 0.5 Mg Tablet 1 Tab PO TID PRN PRN


Novolin N (Nph, Human Insulin Isophane) 100 Unit/1 Ml Vial 35 Units SQ HS


Novolin N (Nph, Human Insulin Isophane) 100 Unit/1 Ml Vial 60 Units SQ DAILY08





Allergies


Allergies:  


Coded Allergies:  


     No Known Drug Allergies (Unverified , 5/21/20)





ROS


General:  YES: Fatigue


PSYCHOLOGICAL ROS:  YES: Depression


Eyes:  Yes Decreased vision


ALLERGY AND IMMUNOLOGY:  YES: Hives


Cardiovascular:  yes Chest Pain


Gastrointestinal:  Yes Nausea


Genitourinary:  YES Other (ANURIA)


Musculoskeletal:  Yes Muscular Weakness, Yes Other (STUMP PAIN)


Neurological:  Yes Weakness





Physical Exam


General:  Alert, Cooperative


HEENT:  Atraumatic, PERRLA


Lungs:  Clear to auscultation


Heart:  Regular rate


Abdomen:  Normal bowel sounds, Soft, No tenderness


Skin:  No breakdown


Neuro:  Normal speech


Psych/Mental Status:  Mental status NL


MUSCULOSKELETAL:  No joint tenderness, No deformity





Vitals


VITALS





Vital Signs








  Date Time  Temp Pulse Resp B/P (MAP) Pulse Ox O2 Delivery O2 Flow Rate FiO2


 


2/7/22 16:14      Room Air  


 


2/7/22 14:38 97.6 74 18 101/27 (51) 93   





 97.6       











Labs


Labs





Laboratory Tests








Test


 2/7/22


06:42 2/7/22


08:46 2/7/22


13:51 2/7/22


14:00


 


White Blood Count


 10.0 x10^3/uL


(4.0-11.0) 


 


 





 


Red Blood Count


 2.52 x10^6/uL


(4.30-5.70) 


 


 





 


Hemoglobin


 7.8 g/dL


(13.0-17.5) 


 


 





 


Hematocrit


 23.7 %


(39.0-53.0) 


 


 





 


Mean Corpuscular Volume 94 fL ()    


 


Mean Corpuscular Hemoglobin 31 pg (25-35)    


 


Mean Corpuscular Hemoglobin


Concent 33 g/dL


(31-37) 


 


 





 


Red Cell Distribution Width


 14.7 %


(11.5-14.5) 


 


 





 


Platelet Count


 217 x10^3/uL


(140-400) 


 


 





 


Neutrophils (%) (Auto) 59 % (31-73)    


 


Lymphocytes (%) (Auto) 29 % (24-48)    


 


Monocytes (%) (Auto) 9 % (0-9)    


 


Eosinophils (%) (Auto) 3 % (0-3)    


 


Basophils (%) (Auto) 1 % (0-3)    


 


Neutrophils # (Auto)


 5.9 x10^3/uL


(1.8-7.7) 


 


 





 


Lymphocytes # (Auto)


 2.9 x10^3/uL


(1.0-4.8) 


 


 





 


Monocytes # (Auto)


 0.9 x10^3/uL


(0.0-1.1) 


 


 





 


Eosinophils # (Auto)


 0.3 x10^3/uL


(0.0-0.7) 


 


 





 


Basophils # (Auto)


 0.1 x10^3/uL


(0.0-0.2) 


 


 





 


Erythrocyte Sedimentation Rate 67 (0-15)    


 


Prothrombin Time


 13.0 SEC


(11.7-14.0) 


 


 





 


Prothromb Time International


Ratio 1.0 (0.8-1.1) 


 


 


 





 


Activated Partial


Thromboplast Time 33 SEC (24-38) 


 


 


 





 


Sodium Level


 140 mmol/L


(136-145) 


 


 





 


Potassium Level


 4.1 mmol/L


(3.5-5.1) 


 


 





 


Chloride Level


 97 mmol/L


() 


 


 





 


Carbon Dioxide Level


 23 mmol/L


(21-32) 


 


 





 


Anion Gap 20 (6-14)    


 


Blood Urea Nitrogen


 67 mg/dL


(8-26) 


 


 





 


Creatinine


 8.1 mg/dL


(0.7-1.3) 


 


 





 


Estimated GFR


(Cockcroft-Gault) 6.9 


 


 


 





 


BUN/Creatinine Ratio 8 (6-20)    


 


Glucose Level


 109 mg/dL


(70-99) 


 


 





 


Calcium Level


 8.6 mg/dL


(8.5-10.1) 


 


 





 


Magnesium Level


 2.2 mg/dL


(1.8-2.4) 


 


 





 


Total Bilirubin


 0.2 mg/dL


(0.2-1.0) 


 


 





 


Aspartate Amino Transf


(AST/SGOT) 13 U/L (15-37) 


 


 


 





 


Alanine Aminotransferase


(ALT/SGPT) 19 U/L (16-63) 


 


 


 





 


Alkaline Phosphatase


 57 U/L


() 


 


 





 


Troponin I High Sensitivity 9 ng/L (4-75)  11 ng/L (4-75)  11 ng/L (4-75)  


 


C-Reactive Protein,


Quantitative 8.8 mg/L


(0-3.3) 


 


 





 


NT-Pro-B-Type Natriuretic


Peptide 4558 pg/mL


(0-124) 


 


 





 


Total Protein


 7.8 g/dL


(6.4-8.2) 


 


 





 


Albumin


 3.5 g/dL


(3.4-5.0) 


 


 





 


Albumin/Globulin Ratio 0.8 (1.0-1.7)    


 


Procalcitonin


 0.41 ng/mL


(0.00-0.10) 


 


 





 


Hepatitis B Surface Antigen


 Nonreactive


(Nonreactive) 


 


 





 


SARS-CoV-2 Antigen (Rapid)


 


 


 


 Negative


(NEGATIVE)








Laboratory Tests








Test


 2/7/22


06:42 2/7/22


08:46 2/7/22


13:51 2/7/22


14:00


 


White Blood Count


 10.0 x10^3/uL


(4.0-11.0) 


 


 





 


Red Blood Count


 2.52 x10^6/uL


(4.30-5.70) 


 


 





 


Hemoglobin


 7.8 g/dL


(13.0-17.5) 


 


 





 


Hematocrit


 23.7 %


(39.0-53.0) 


 


 





 


Mean Corpuscular Volume 94 fL ()    


 


Mean Corpuscular Hemoglobin 31 pg (25-35)    


 


Mean Corpuscular Hemoglobin


Concent 33 g/dL


(31-37) 


 


 





 


Red Cell Distribution Width


 14.7 %


(11.5-14.5) 


 


 





 


Platelet Count


 217 x10^3/uL


(140-400) 


 


 





 


Neutrophils (%) (Auto) 59 % (31-73)    


 


Lymphocytes (%) (Auto) 29 % (24-48)    


 


Monocytes (%) (Auto) 9 % (0-9)    


 


Eosinophils (%) (Auto) 3 % (0-3)    


 


Basophils (%) (Auto) 1 % (0-3)    


 


Neutrophils # (Auto)


 5.9 x10^3/uL


(1.8-7.7) 


 


 





 


Lymphocytes # (Auto)


 2.9 x10^3/uL


(1.0-4.8) 


 


 





 


Monocytes # (Auto)


 0.9 x10^3/uL


(0.0-1.1) 


 


 





 


Eosinophils # (Auto)


 0.3 x10^3/uL


(0.0-0.7) 


 


 





 


Basophils # (Auto)


 0.1 x10^3/uL


(0.0-0.2) 


 


 





 


Erythrocyte Sedimentation Rate 67 (0-15)    


 


Prothrombin Time


 13.0 SEC


(11.7-14.0) 


 


 





 


Prothromb Time International


Ratio 1.0 (0.8-1.1) 


 


 


 





 


Activated Partial


Thromboplast Time 33 SEC (24-38) 


 


 


 





 


Sodium Level


 140 mmol/L


(136-145) 


 


 





 


Potassium Level


 4.1 mmol/L


(3.5-5.1) 


 


 





 


Chloride Level


 97 mmol/L


() 


 


 





 


Carbon Dioxide Level


 23 mmol/L


(21-32) 


 


 





 


Anion Gap 20 (6-14)    


 


Blood Urea Nitrogen


 67 mg/dL


(8-26) 


 


 





 


Creatinine


 8.1 mg/dL


(0.7-1.3) 


 


 





 


Estimated GFR


(Cockcroft-Gault) 6.9 


 


 


 





 


BUN/Creatinine Ratio 8 (6-20)    


 


Glucose Level


 109 mg/dL


(70-99) 


 


 





 


Calcium Level


 8.6 mg/dL


(8.5-10.1) 


 


 





 


Magnesium Level


 2.2 mg/dL


(1.8-2.4) 


 


 





 


Total Bilirubin


 0.2 mg/dL


(0.2-1.0) 


 


 





 


Aspartate Amino Transf


(AST/SGOT) 13 U/L (15-37) 


 


 


 





 


Alanine Aminotransferase


(ALT/SGPT) 19 U/L (16-63) 


 


 


 





 


Alkaline Phosphatase


 57 U/L


() 


 


 





 


Troponin I High Sensitivity 9 ng/L (4-75)  11 ng/L (4-75)  11 ng/L (4-75)  


 


C-Reactive Protein,


Quantitative 8.8 mg/L


(0-3.3) 


 


 





 


NT-Pro-B-Type Natriuretic


Peptide 4558 pg/mL


(0-124) 


 


 





 


Total Protein


 7.8 g/dL


(6.4-8.2) 


 


 





 


Albumin


 3.5 g/dL


(3.4-5.0) 


 


 





 


Albumin/Globulin Ratio 0.8 (1.0-1.7)    


 


Procalcitonin


 0.41 ng/mL


(0.00-0.10) 


 


 





 


Hepatitis B Surface Antigen


 Nonreactive


(Nonreactive) 


 


 





 


SARS-CoV-2 Antigen (Rapid)


 


 


 


 Negative


(NEGATIVE)











Assessment/Plan


Assessment/Plan


IMP





CHEST PAIN


ESRD MWF-L FA AVF


ANEMIA


LEFT BKA STUMP PAIN


DM II


HTN





PLAN





HD TODAY


UF TO TW


JOANNE AS NEEDED


CARDIOLOGY AND VASCULAR EVALUATION


WILL FOLLOW











RACHEAL GUERRERO MD                  Feb 7, 2022 16:46

## 2022-02-07 NOTE — RAD
CTA AORTA/RUNOFF W/WO +POST



History: Suspected rest pain of left lower extremity, vasculopathy, abnormal arterial Doppler.



Comparison: Lower extremity Doppler ultrasound 2/7/2022.



Technique: CT angiography of the abdomen and pelvis with runoff to the bilateral lower extremities wi
th intravenous contrast. 3-D postprocessing performed with rotating 3-D surface renderings. 



Findings:

The thoracic and abdominal aorta are normal caliber. There is moderate calcification of the abdominal
 aorta. The origins of the celiac, superior mesenteric the left renal arteries are widely patent. The
re is approximately 50 percent stenosis at the origin of the right renal artery and complete versus n
ear complete stenosis of the origin of the inferior mesenteric artery which iliac the origin of the e
ither imperceptible fluid the origin more likely collateral flow.



The common iliac, internal and external iliac arteries are patent with moderate to heavy atherosclero
tic calcification. Heavy atherosclerotic calcification of the bilateral common femoral arteries.



There is a severe stenosis at the origin of the right superficial femoral artery with approximately 1
.5 mm diameter lumen, approximately 75 percent stenosis. Enhancement is seen within the right SFA to 
the popliteal artery. At the trifurcation the vessels are heavily circumferentially calcified and lum
inal enhancement is not well perceived however limited significantly by the heavy peripheral calcific
ation.



The left superficial femoral artery demonstrates severe long segment stenosis with approximately 1.5 
mm lumen at the origin with heavy atherosclerosis along the entire course. Patent but atherosclerotic
 left deep femoral artery. Complete or near complete stenosis of the distal left superficial femoral 
artery at the level of the distal femoral metadiaphysis. Irregular enhancement of the popliteal arter
y and which trifurcates immediately prior to below-knee amputation. Luminal enhancement is seen in th
e left anterior tibial artery however not definitively seen in the short segments of the peroneal and
 posterior tibial arteries.



Dependent changes in the lung bases. Heavy coronary artery calcifications. The liver, gallbladder,, p
ancreas and spleen are unremarkable. There is a calcified right adrenal nodule. No hydronephrosis. Th
e bladder and prostate are unremarkable. The stomach, small bowel and colon are within normal limits.
 No adenopathy or free fluid. Bilateral L5 pars interarticularis defects. Mild degenerative changes i
n the spine.



Impression: 



1.  Severe long segment stenosis of the left superficial femoral artery with approximately 1.5 mm lum
en at the origin and complete or near complete stenosis at the distal left SFA at the level of the di
stal femoral metadiaphysis.

2.  Severe approximately 75 percent stenosis of the right superficial femoral artery at the origin. R
ight popliteal artery enhancement without definite enhancement seen within the right popliteal trifur
cation which is limited due to severe circumferential atherosclerotic calcifications of the trifurcat
ion vessels.

3.  Complete to near complete occlusion of the origin of the inferior mesenteric artery.

4.  Approximately 50 percent stenosis right renal artery at the origin.





------

Exposure: One or more of the following individualized dose reduction techniques were utilized for thi
s examination:  

1. Automated exposure control

2. Adjustment of the mA and/or kV according to patient size

3. Use of iterative reconstruction technique.



Electronically signed by: Yuri Dockery MD (2/7/2022 10:22 AM) [id number]

## 2022-02-07 NOTE — PDOC2
CONSULT


Date of Service


Date of Service


DATE: 2/7/22 


TIME: 10:58





Reason for Consult


Reason for Consult:


Rest pain to left BKA stump





Referring Physician


Referring Physician:


Dr. Iglesias





Identification/Chief Complaint


Chief Complaint


Left stump pain





Source


Source:  Patient





History of Present Illness


Reason for Visit:


This is a pleasant 56-year-old diabetic male with end-stage renal disease on 

hemodialysis who presents to the ED with left BKA stump pain.  He had his BKA 

several years ago, had 1 surgery and 2019 for incision and drainage on the stump

after a fall otherwise has had no issues with it and has been ambulating with a 

prosthesis.  He reports his pain started on Thursday and has progressively 

worsened.  It is triggered and worsened by using the prosthesis and 

improves/resolves when he lets it rest for periods of time.  He rates his pain 

is moderate.  Does not radiate anywhere.  He denies any recent falls. He did get

his prosthesis readjusted with new inserts about one month ago. He denies any 

fevers or chills.  He denies a smoking history.  He dialyzes Monday Wednesday Friday through left arm fistula placed in 2017. Denies any hand complaints or 

concerns with fistula. He had a CTA showing bilateral SFA disease. He had a 

plain xray of the stump without significant findings. He takes aspirin and stat

in at home.





Past Medical History


Cardiovascular:  CAD, HTN, Hyperlipidemia, Other


Pulmonary:  COPD


GI:  Constipation


Heme/Onc:  Anemia NOS


Hepatobiliary:  No pertinent hx


Psych:  No pertinent hx


Rheumatologic:  No pertinent hx


Infectious disease:  No pertinent hx


Renal/:  Chronic renal insuff


Endocrine:  Diabetes, Hyperparathyroidism





Past Surgical History


Past Surgical History


L BKA 


L BKA I & D 7/17/19


L arm AVF fistula side branch ligation 12/21/17


Past Surgical History:  Other





Family History


Family History:  Diabetes, Hypertension





Social History


Social History


Denies smoking


ALCOHOL:  none


Lives:  Alone





Current Problem List


Problem List


Problems


Medical Problems:


(1) Acute pain of left lower extremity


Status: Acute  





(2) Other chest pain


Status: Acute  





(3) Uremia


Status: Acute  











Current Medications


Current Medications





Current Medications


Aspirin (Myles Aspirin) 325 mg 1X  ONCE PO  Last administered on 2/7/22at 07:20;

 Start 2/7/22 at 07:00;  Stop 2/7/22 at 07:01;  Status DC


Fentanyl Citrate (Fentanyl 2ml Vial) 50 mcg 1X  ONCE IVP  Last administered on 

2/7/22at 07:21;  Start 2/7/22 at 07:00;  Stop 2/7/22 at 07:01;  Status DC


Ondansetron HCl (Zofran) 4 mg PRN Q8HRS  PRN IVP NAUSEA/VOMITING;  Start 2/7/22 

at 08:45;  Stop 2/8/22 at 08:44


Fentanyl Citrate (Fentanyl 2ml Vial) 25 mcg PRN Q1HR  PRN IVP PAIN;  Start 

2/7/22 at 08:45;  Stop 2/8/22 at 08:44


Iohexol (Omnipaque 350 Mg/ml) 80 ml 1X  ONCE IV  Last administered on 2/7/22at 

09:00;  Start 2/7/22 at 09:00;  Stop 2/7/22 at 09:01;  Status DC


Info (CONTRAST GIVEN -- Rx MONITORING) 1 each PRN DAILY  PRN MC SEE COMMENTS;  

Start 2/7/22 at 09:00;  Stop 2/9/22 at 08:59





Active Scripts


Active


Augmentin 875-125 Tablet (Amoxicillin/Potassium Clav) 1 Each Tablet 1 Tab PO BID

PRN 10 Days


Azithromycin Tablet (Azithromycin) 250 Mg Tablet 250 Mg PO DAILY 4 Days


Gabapentin ** (Gabapentin) 300 Mg Capsule 300 Mg PO DAILY


Reported


Furosemide 40 Mg Tablet 40  DAILY


Gabapentin 300 Mg Capsule 300  BID


Trazodone Hcl 50 Mg Tablet 50  HS


Pantoprazole Sodium 40 Mg Tablet.dr 40  DAILY


Renagel (Sevelamer Hcl) 800 Mg Tablet 800  TID


Oxycodone-Acetaminophen  (Oxycodone Hcl/Acetaminophen) 1 Each Tablet 10  

QID


Atorvastatin Calcium 20 Mg Tablet 20  DAILY


Amlodipine Besylate 2.5 Mg Tablet 2.5  DAILY


Aspirin 325 Mg Tablet 1 Tab PO DAILY


Alprazolam 0.5 Mg Tablet 1 Tab PO TID PRN PRN


Novolin N (Nph, Human Insulin Isophane) 100 Unit/1 Ml Vial 35 Units SQ HS


Novolin N (Nph, Human Insulin Isophane) 100 Unit/1 Ml Vial 60 Units SQ DAILY08





Allergies


Allergies:  


Coded Allergies:  


     No Known Drug Allergies (Unverified , 5/21/20)





ROS


General:  No: Chills, Other (fevers)


Respiratory:  No: Cough, Shortness of breath


Cardiovascular:  No Chest Pain, No Edema


Gastrointestinal:  No Nausea, No Vomiting, No Abdominal Pain


Musculoskeletal:  Yes Gait Disturbance, Yes Pain In: (L BKA)


Neurological:  No Numbness/Tingling, No Weakness


Skin:  Yes Dry Skin





Physical Exam


General:  Alert, Oriented X3, Cooperative, No acute distress


HEENT:  Atraumatic, EOMI


Lungs:  Normal air movement (room air)


Heart:  Regular rate ( 79)


Abdomen:  Soft, No tenderness


Extremities:  No cyanosis, No edema, Other (L arm AVF thrill, hand and fingers 

warm with excellent cap refill. Could not feel L radial pulse, however pt had 

saran wrap over arm/wrist for ointment absorption prior to dialysis pokes. L BKA

stump warm with good color, no swelling or wounds. )


Skin:  No rashes, No significant lesion, Other (L BKA healed scars, no wounds, 

no erythema, or concerns. )


Neuro:  Normal speech, Normal tone


Psych/Mental Status:  Mental status NL, Mood NL


MUSCULOSKELETAL:  No deformity, No swelling, No muscular tenderness noted, Full 

range of motion without pain





Vitals


VITALS





Vital Signs








  Date Time  Temp Pulse Resp B/P (MAP) Pulse Ox O2 Delivery O2 Flow Rate FiO2


 


2/7/22 08:38  70 13 109/56 (73) 94 Room Air  


 


2/7/22 06:33 98.4       





 98.4       











Labs


Labs





Laboratory Tests








Test


 2/7/22


06:42 2/7/22


08:46


 


White Blood Count


 10.0 x10^3/uL


(4.0-11.0) 





 


Red Blood Count


 2.52 x10^6/uL


(4.30-5.70) 





 


Hemoglobin


 7.8 g/dL


(13.0-17.5) 





 


Hematocrit


 23.7 %


(39.0-53.0) 





 


Mean Corpuscular Volume 94 fL ()  


 


Mean Corpuscular Hemoglobin 31 pg (25-35)  


 


Mean Corpuscular Hemoglobin


Concent 33 g/dL


(31-37) 





 


Red Cell Distribution Width


 14.7 %


(11.5-14.5) 





 


Platelet Count


 217 x10^3/uL


(140-400) 





 


Neutrophils (%) (Auto) 59 % (31-73)  


 


Lymphocytes (%) (Auto) 29 % (24-48)  


 


Monocytes (%) (Auto) 9 % (0-9)  


 


Eosinophils (%) (Auto) 3 % (0-3)  


 


Basophils (%) (Auto) 1 % (0-3)  


 


Neutrophils # (Auto)


 5.9 x10^3/uL


(1.8-7.7) 





 


Lymphocytes # (Auto)


 2.9 x10^3/uL


(1.0-4.8) 





 


Monocytes # (Auto)


 0.9 x10^3/uL


(0.0-1.1) 





 


Eosinophils # (Auto)


 0.3 x10^3/uL


(0.0-0.7) 





 


Basophils # (Auto)


 0.1 x10^3/uL


(0.0-0.2) 





 


Erythrocyte Sedimentation Rate 67 (0-15)  


 


Prothrombin Time


 13.0 SEC


(11.7-14.0) 





 


Prothromb Time International


Ratio 1.0 (0.8-1.1) 


 





 


Activated Partial


Thromboplast Time 33 SEC (24-38) 


 





 


Sodium Level


 140 mmol/L


(136-145) 





 


Potassium Level


 4.1 mmol/L


(3.5-5.1) 





 


Chloride Level


 97 mmol/L


() 





 


Carbon Dioxide Level


 23 mmol/L


(21-32) 





 


Anion Gap 20 (6-14)  


 


Blood Urea Nitrogen


 67 mg/dL


(8-26) 





 


Creatinine


 8.1 mg/dL


(0.7-1.3) 





 


Estimated GFR


(Cockcroft-Gault) 6.9 


 





 


BUN/Creatinine Ratio 8 (6-20)  


 


Glucose Level


 109 mg/dL


(70-99) 





 


Calcium Level


 8.6 mg/dL


(8.5-10.1) 





 


Magnesium Level


 2.2 mg/dL


(1.8-2.4) 





 


Total Bilirubin


 0.2 mg/dL


(0.2-1.0) 





 


Aspartate Amino Transf


(AST/SGOT) 13 U/L (15-37) 


 





 


Alanine Aminotransferase


(ALT/SGPT) 19 U/L (16-63) 


 





 


Alkaline Phosphatase


 57 U/L


() 





 


Troponin I High Sensitivity 9 ng/L (4-75)  11 ng/L (4-75) 


 


C-Reactive Protein,


Quantitative 8.8 mg/L


(0-3.3) 





 


NT-Pro-B-Type Natriuretic


Peptide 4558 pg/mL


(0-124) 





 


Total Protein


 7.8 g/dL


(6.4-8.2) 





 


Albumin


 3.5 g/dL


(3.4-5.0) 





 


Albumin/Globulin Ratio 0.8 (1.0-1.7)  


 


Procalcitonin


 0.41 ng/mL


(0.00-0.10) 











Laboratory Tests








Test


 2/7/22


06:42 2/7/22


08:46


 


White Blood Count


 10.0 x10^3/uL


(4.0-11.0) 





 


Red Blood Count


 2.52 x10^6/uL


(4.30-5.70) 





 


Hemoglobin


 7.8 g/dL


(13.0-17.5) 





 


Hematocrit


 23.7 %


(39.0-53.0) 





 


Mean Corpuscular Volume 94 fL ()  


 


Mean Corpuscular Hemoglobin 31 pg (25-35)  


 


Mean Corpuscular Hemoglobin


Concent 33 g/dL


(31-37) 





 


Red Cell Distribution Width


 14.7 %


(11.5-14.5) 





 


Platelet Count


 217 x10^3/uL


(140-400) 





 


Neutrophils (%) (Auto) 59 % (31-73)  


 


Lymphocytes (%) (Auto) 29 % (24-48)  


 


Monocytes (%) (Auto) 9 % (0-9)  


 


Eosinophils (%) (Auto) 3 % (0-3)  


 


Basophils (%) (Auto) 1 % (0-3)  


 


Neutrophils # (Auto)


 5.9 x10^3/uL


(1.8-7.7) 





 


Lymphocytes # (Auto)


 2.9 x10^3/uL


(1.0-4.8) 





 


Monocytes # (Auto)


 0.9 x10^3/uL


(0.0-1.1) 





 


Eosinophils # (Auto)


 0.3 x10^3/uL


(0.0-0.7) 





 


Basophils # (Auto)


 0.1 x10^3/uL


(0.0-0.2) 





 


Erythrocyte Sedimentation Rate 67 (0-15)  


 


Prothrombin Time


 13.0 SEC


(11.7-14.0) 





 


Prothromb Time International


Ratio 1.0 (0.8-1.1) 


 





 


Activated Partial


Thromboplast Time 33 SEC (24-38) 


 





 


Sodium Level


 140 mmol/L


(136-145) 





 


Potassium Level


 4.1 mmol/L


(3.5-5.1) 





 


Chloride Level


 97 mmol/L


() 





 


Carbon Dioxide Level


 23 mmol/L


(21-32) 





 


Anion Gap 20 (6-14)  


 


Blood Urea Nitrogen


 67 mg/dL


(8-26) 





 


Creatinine


 8.1 mg/dL


(0.7-1.3) 





 


Estimated GFR


(Cockcroft-Gault) 6.9 


 





 


BUN/Creatinine Ratio 8 (6-20)  


 


Glucose Level


 109 mg/dL


(70-99) 





 


Calcium Level


 8.6 mg/dL


(8.5-10.1) 





 


Magnesium Level


 2.2 mg/dL


(1.8-2.4) 





 


Total Bilirubin


 0.2 mg/dL


(0.2-1.0) 





 


Aspartate Amino Transf


(AST/SGOT) 13 U/L (15-37) 


 





 


Alanine Aminotransferase


(ALT/SGPT) 19 U/L (16-63) 


 





 


Alkaline Phosphatase


 57 U/L


() 





 


Troponin I High Sensitivity 9 ng/L (4-75)  11 ng/L (4-75) 


 


C-Reactive Protein,


Quantitative 8.8 mg/L


(0-3.3) 





 


NT-Pro-B-Type Natriuretic


Peptide 4558 pg/mL


(0-124) 





 


Total Protein


 7.8 g/dL


(6.4-8.2) 





 


Albumin


 3.5 g/dL


(3.4-5.0) 





 


Albumin/Globulin Ratio 0.8 (1.0-1.7)  


 


Procalcitonin


 0.41 ng/mL


(0.00-0.10) 














Images


Images


CTA reviewed: Bilateral SFA disease


Xray L leg: no significant findings





Assessment/Plan


Assessment/Plan


Pain to L BKA


Moderate to severe PAD





I do not think his pain is solely contributable to his vascular disease.  I do 

not think it is rest pain, possibly claudication. His pain resolves at rest and 

is not constant. His stump is warm without any wounds.  He recently had his 

prosthesis refitted with new inserts and this may or may not be contributing to 

his pain. He does have significant atherosclerotic disease to L SFA and pop, 

however not sure there is an indication to assess further/treat it at this time.

Will discuss with surgeon. Arteriogram would likely be next step if surgeon 

thinks indication is there, however based on CTA not sure his disease would be 

amenable to percutaneous intervention. Recommend optimal prosthesis management 

with [email or web address]. Surgeon will see with further recommendations.











JOSE JUAN CORONADO              Feb 7, 2022 11:26

## 2022-02-07 NOTE — EKG
[facility]

              [address] [place] [address]

Test Date:    2022               Test Time:    07:08:45

Pat Name:     ARABELLA WASHINGTON           Department:   

Patient ID:   [id number]           Room:          

Gender:       M                        Technician:   

:          [id number]               Requested By: CHANTELLE TRENT

Order Number: [id number]           Reading MD:   Michael Tucker MD

                                 Measurements

Intervals                              Axis          

Rate:         75                       P:            -30

OR:           168                      QRS:          -27

QRSD:         96                       T:            64

QT:           388                                    

QTc:          436                                    

                           Interpretive Statements

SINUS RHYTHM

Electronically Signed On 2022 9:23:00 CST by Michael Tucker MD

## 2022-02-07 NOTE — PDOC2
KAYLA MELO APRN 2/7/22 1238:


CARDIAC CONSULT


DATE OF CONSULT


Date of Consult


DATE: 2/7/22 


TIME: 12:36





REASON FOR CONSULT


Reason for Consult:


Chest pain





REFERRING PHYSICIAN


Referring Physician:


Dr. Cisneros





SOURCE


Source:  Chart review, Patient





HISTORY OF PRESENT ILLNESS


HISTORY OF PRESENT ILLNESS


This is a 57 yo male who presented secondary to  left BKA stump pain. Patient 

reports intense throbbing pain in his left leg stump since yesterday. This 

morning, on his way to HD, he experienced mild tightness in his chest. Was also 

having significant pain in his stump. Pain in his stump was significant that he 

asked to be brought to the ED for further evaluation and treatment. Has had no 

further chest pain. Also denies and dizziness, diaphoresis, palpitations, or 

nausea/vomiting.





PAST MEDICAL HISTORY


Cardiovascular:  CAD, HTN, Hyperlipidemia, Other (PAD s/p PTA RLE and left BKA)


Renal/:  Chronic renal failure (ESRD on HD )


Endocrine:  Diabetes





PAST SURGICAL HISTORY


Past Surgical History:  CABG





FAMILY HISTORY


Family History:  Diabetes, Hypertension





SOCIAL HISTORY


Smoke:  No


ALCOHOL:  none


Drugs:  None


Lives:  with Family





CURRENT MEDICATIONS


CURRENT MEDICATIONS





Current Medications








 Medications


  (Trade)  Dose


 Ordered  Sig/Ysiel


 Route


 PRN Reason  Start Time


 Stop Time Status Last Admin


Dose Admin


 


 Aspirin


  (Myles Aspirin)  325 mg  1X  ONCE


 PO


   2/7/22 07:00


 2/7/22 07:01 DC 2/7/22 07:20





 


 Fentanyl Citrate


  (Fentanyl 2ml


 Vial)  50 mcg  1X  ONCE


 IVP


   2/7/22 07:00


 2/7/22 07:01 DC 2/7/22 07:21





 


 Iohexol


  (Omnipaque 350


 Mg/ml)  80 ml  1X  ONCE


 IV


   2/7/22 09:00


 2/7/22 09:01 DC 2/7/22 09:00














ALLERGIES


ALLERGIES:  


Coded Allergies:  


     No Known Drug Allergies (Unverified , 5/21/20)





ROS


Review of System


14 point ROS conducted with pertinent positives noted above in HPI





PHYSICAL EXAM


General:  Alert, Oriented X3, Cooperative, No acute distress


HEENT:  Atraumatic


Lungs:  Clear to auscultation


Heart:  Regular rate


Abdomen:  Soft, No tenderness


Extremities:  No edema, Other (LBKA )


Neuro:  Normal speech, Sensation intact


Psych/Mental Status:  Mental status NL, Mood NL


MUSCULOSKELETAL:  Osteoarthritic changes both hands





VITALS/I&O


VITALS/I&O:





                                   Vital Signs








  Date Time  Temp Pulse Resp B/P (MAP) Pulse Ox O2 Delivery O2 Flow Rate FiO2


 


2/7/22 08:38  70 13 109/56 (73) 94 Room Air  


 


2/7/22 06:33 98.4       





 98.4       











LABS


Lab:





                                Laboratory Tests








Test


 2/7/22


06:42 2/7/22


08:46


 


White Blood Count


 10.0 x10^3/uL


(4.0-11.0) 





 


Red Blood Count


 2.52 x10^6/uL


(4.30-5.70)  L 





 


Hemoglobin


 7.8 g/dL


(13.0-17.5)  L 





 


Hematocrit


 23.7 %


(39.0-53.0)  L 





 


Mean Corpuscular Volume


 94 fL ()


 





 


Mean Corpuscular Hemoglobin 31 pg (25-35)   


 


Mean Corpuscular Hemoglobin


Concent 33 g/dL


(31-37) 





 


Red Cell Distribution Width


 14.7 %


(11.5-14.5)  H 





 


Platelet Count


 217 x10^3/uL


(140-400) 





 


Neutrophils (%) (Auto) 59 % (31-73)   


 


Lymphocytes (%) (Auto) 29 % (24-48)   


 


Monocytes (%) (Auto) 9 % (0-9)   


 


Eosinophils (%) (Auto) 3 % (0-3)   


 


Basophils (%) (Auto) 1 % (0-3)   


 


Neutrophils # (Auto)


 5.9 x10^3/uL


(1.8-7.7) 





 


Lymphocytes # (Auto)


 2.9 x10^3/uL


(1.0-4.8) 





 


Monocytes # (Auto)


 0.9 x10^3/uL


(0.0-1.1) 





 


Eosinophils # (Auto)


 0.3 x10^3/uL


(0.0-0.7) 





 


Basophils # (Auto)


 0.1 x10^3/uL


(0.0-0.2) 





 


Erythrocyte Sedimentation Rate 67 (0-15)  H 


 


Prothrombin Time


 13.0 SEC


(11.7-14.0) 





 


Prothrombin Time INR 1.0 (0.8-1.1)   


 


Activated Partial


Thromboplast Time 33 SEC (24-38)


 





 


Sodium Level


 140 mmol/L


(136-145) 





 


Potassium Level


 4.1 mmol/L


(3.5-5.1) 





 


Chloride Level


 97 mmol/L


()  L 





 


Carbon Dioxide Level


 23 mmol/L


(21-32) 





 


Anion Gap 20 (6-14)  H 


 


Blood Urea Nitrogen


 67 mg/dL


(8-26)  H 





 


Creatinine


 8.1 mg/dL


(0.7-1.3)  H 





 


Estimated GFR


(Cockcroft-Gault) 6.9  


 





 


BUN/Creatinine Ratio 8 (6-20)   


 


Glucose Level


 109 mg/dL


(70-99)  H 





 


Calcium Level


 8.6 mg/dL


(8.5-10.1) 





 


Magnesium Level


 2.2 mg/dL


(1.8-2.4) 





 


Total Bilirubin


 0.2 mg/dL


(0.2-1.0) 





 


Aspartate Amino Transferase


(AST) 13 U/L (15-37)


L 





 


Alanine Aminotransferase (ALT)


 19 U/L (16-63)


 





 


Alkaline Phosphatase


 57 U/L


() 





 


Troponin I High Sensitivity


 9 ng/L (4-75)  


 11 ng/L (4-75)





 


C-Reactive Protein,


Quantitative 8.8 mg/L


(0-3.3)  H 





 


NT-Pro-B-Type Natriuretic


Peptide 4558 pg/mL


(0-124)  H 





 


Total Protein


 7.8 g/dL


(6.4-8.2) 





 


Albumin


 3.5 g/dL


(3.4-5.0) 





 


Albumin/Globulin Ratio


 0.8 (1.0-1.7)


L 





 


Procalcitonin


 0.41 ng/mL


(0.00-0.10)  H 





 


Hepatitis B Surface Antigen


 Nonreactive


(Nonreactive) 








                                Laboratory Tests


2/7/22 06:42








                                Laboratory Tests


2/7/22 06:42











ECHOCARDIOGRAM


ECHOCARDIOGRAM


<Conclusion>


The left ventricular systolic function is normal.  


Estimated ejection fraction 55-60%.


There is normal LV segmental wall motion.


Transmitral Doppler flow pattern is Grade II-pseudonormal filling dynamics.


Mild valvular aortic stenosis.


Mild to moderate mitral valve stenosis.


There is no evidence of significant pericardial effusion.





DATE:     11/23/21 [id number]





STRESS TEST


STRESS TEST


Conclusion


1. No evidence of EKG changes with stress testing.


2. Normal perfusion at stress/rest.


3. Low risk study.


4. EF > 60%.





DATE:     07/01/18 0842





ASSESSMENT/PLAN


ASSESSMENT/PLAN


1.  Chest pain, atypical. AMI ruled out. Recent echo with preserved LV systolic 

function


2.  CAD s/p CABG 2017. clinically stable 


3.  Left lower extremity stump pain


4.  PAD s/p PTA RLE and left BKA. vascular following 


5.  Hypertension; controlled 


6.  Hyperlipidemia; statin therapy 


7.  ESRD on HD


8.  Anemia of chronic disease 








Recommendations





Continue secondary prevention measures


Fluid offloading via HD


Follow vascular recommendations


Outpatient ischemic evaluation


Supportive care





TRACY BLAKELY MD 2/7/22 1655:


CARDIAC CONSULT


ASSESSMENT/PLAN


ASSESSMENT/PLAN


Patient seen and examined.  Agree with NP's assessment and plan.


Chest pain with atypical features.  Myocardial infarction has been ruled out.


CAD s/p CABG status clinically stable


We will consider ischemic evaluation as outpatient


Recent 2D echo showed normal LV systolic function


PAD stable -vascular surgery following


Continue hemodialysis per nephrology team


Thank you for your consultation











KAYLA MELO            Feb 7, 2022 12:38


TRACY BLAKELY MD            Feb 7, 2022 16:55

## 2022-02-08 VITALS — DIASTOLIC BLOOD PRESSURE: 83 MMHG | SYSTOLIC BLOOD PRESSURE: 120 MMHG

## 2022-02-08 VITALS — SYSTOLIC BLOOD PRESSURE: 97 MMHG | DIASTOLIC BLOOD PRESSURE: 37 MMHG

## 2022-02-08 VITALS
DIASTOLIC BLOOD PRESSURE: 52 MMHG | DIASTOLIC BLOOD PRESSURE: 52 MMHG | SYSTOLIC BLOOD PRESSURE: 93 MMHG | SYSTOLIC BLOOD PRESSURE: 93 MMHG

## 2022-02-08 LAB
ANION GAP SERPL CALC-SCNC: 12 MMOL/L (ref 6–14)
BASOPHILS # BLD AUTO: 0 X10^3/UL (ref 0–0.2)
BASOPHILS NFR BLD: 1 % (ref 0–3)
BUN SERPL-MCNC: 31 MG/DL (ref 8–26)
CALCIUM SERPL-MCNC: 8.1 MG/DL (ref 8.5–10.1)
CHLORIDE SERPL-SCNC: 100 MMOL/L (ref 98–107)
CO2 SERPL-SCNC: 30 MMOL/L (ref 21–32)
CREAT SERPL-MCNC: 5 MG/DL (ref 0.7–1.3)
EOSINOPHIL NFR BLD: 0.2 X10^3/UL (ref 0–0.7)
EOSINOPHIL NFR BLD: 3 % (ref 0–3)
ERYTHROCYTE [DISTWIDTH] IN BLOOD BY AUTOMATED COUNT: 14.5 % (ref 11.5–14.5)
GFR SERPLBLD BASED ON 1.73 SQ M-ARVRAT: 12 ML/MIN
GLUCOSE SERPL-MCNC: 164 MG/DL (ref 70–99)
HCT VFR BLD CALC: 21.7 % (ref 39–53)
HGB BLD-MCNC: 7.1 G/DL (ref 13–17.5)
LYMPHOCYTES # BLD: 2 X10^3/UL (ref 1–4.8)
LYMPHOCYTES NFR BLD AUTO: 33 % (ref 24–48)
MCH RBC QN AUTO: 31 PG (ref 25–35)
MCHC RBC AUTO-ENTMCNC: 33 G/DL (ref 31–37)
MCV RBC AUTO: 94 FL (ref 79–100)
MONO #: 0.6 X10^3/UL (ref 0–1.1)
MONOCYTES NFR BLD: 10 % (ref 0–9)
NEUT #: 3.2 X10^3/UL (ref 1.8–7.7)
NEUTROPHILS NFR BLD AUTO: 54 % (ref 31–73)
PLATELET # BLD AUTO: 183 X10^3/UL (ref 140–400)
POTASSIUM SERPL-SCNC: 4.6 MMOL/L (ref 3.5–5.1)
RBC # BLD AUTO: 2.3 X10^6/UL (ref 4.3–5.7)
SODIUM SERPL-SCNC: 142 MMOL/L (ref 136–145)
WBC # BLD AUTO: 6 X10^3/UL (ref 4–11)

## 2022-02-08 RX ADMIN — INSULIN LISPRO SCH UNITS: 100 INJECTION, SOLUTION INTRAVENOUS; SUBCUTANEOUS at 12:05

## 2022-02-08 RX ADMIN — SEVELAMER CARBONATE SCH MG: 800 TABLET, FILM COATED ORAL at 09:32

## 2022-02-08 RX ADMIN — INSULIN LISPRO SCH UNITS: 100 INJECTION, SOLUTION INTRAVENOUS; SUBCUTANEOUS at 09:42

## 2022-02-08 RX ADMIN — OXYCODONE HYDROCHLORIDE AND ACETAMINOPHEN SCH TAB: 10; 325 TABLET ORAL at 09:33

## 2022-02-08 RX ADMIN — SEVELAMER CARBONATE SCH MG: 800 TABLET, FILM COATED ORAL at 12:01

## 2022-02-08 RX ADMIN — OXYCODONE HYDROCHLORIDE AND ACETAMINOPHEN SCH TAB: 10; 325 TABLET ORAL at 13:00

## 2022-02-08 RX ADMIN — GABAPENTIN SCH MG: 300 CAPSULE ORAL at 09:32

## 2022-02-08 NOTE — HP
DATE OF SERVICE: 02/08/2022

ADMIT DATE: 02/07/2022

CHIEF COMPLAINT AND HISTORY OF PRESENT ILLNESS:  This 56-year-old male presented

to the Emergency Room with left leg pain, particularly the his stump.  He was 

felt to have, maybe, acute ischemia of the same and admitted.  He also had some 

midsternal, nonradiating chest discomfort starting at 5:00 a.m. on the morning 

of admission. He characterized as constant, nonexertional like heartburn with a 

severity of 6/10.  Denies any respiratory symptoms with the same and also denied

any shortness of breath, diaphoresis, nausea, vomiting, lightheadedness with the

same.  He was admitted with this constellation.  Also found to be anemic with a 

hemoglobin of 7.8, likely related to his end-stage renal disease for which he is

on dialysis, had serial troponins, which have been negative and a nonischemic 

EKG.



PAST MEDICAL HISTORY:  Remarkable for longstanding diabetes, hypertension, MI, 

renal failure and end-stage renal disease, on dialysis.  He has a history of 

peripheral vascular disease with a left below-the-knee amputation.  He does 

dialysis Monday, Wednesday, and Fridays, has peripheral neuropathy.



PAST SURGICAL HISTORY:  Remarkable for coronary artery bypass grafting, left 

below-the-knee amputation, stenting of the right leg and AV shunt in his arm.



MEDICATIONS:  Brought with the patient, listed on the computer have been 

addressed.



ALLERGIES:  He has no known drug allergies.



SOCIAL HISTORY:  He is a former smoker, nondrinker, does not use drugs.



FAMILY HISTORY:  Noncontributory.



REVIEW OF SYSTEMS:  As mentioned in history of present illness.



PHYSICAL EXAMINATION:

GENERAL:  He is a well-developed, well-nourished white male in no acute 

distress. Leg pain is somewhat better, actually quite a bit better this morning.

 He denies any further chest pain.

HEAD, EYES, EARS, NOSE AND THROAT:  Unremarkable.

NECK:  Supple, without adenopathy or thyromegaly.

CHEST:  Clear to auscultation and percussion.

HEART:  Regular rate and rhythm without S3, S4 or murmur.

ABDOMEN:  Soft, nontender, without hepatosplenomegaly or masses.

EXTREMITIES:  Without cyanosis, clubbing, or edema.  He does have a left 

below-the-knee amputation with a warm stump and nontender to touch with no 

evidence of ischemia.

NEUROLOGIC:  He is intact.  In fact, Vascular Surgery has seen him and feel like

this is not any sort of vascular problem causing his current pain and likely 

related to a new sleeve he has gotten and not fitting in his prosthesis well.



ASSESSMENT:

1.  Noncardiac chest pain.

2.  Left below knee amputation stump pain, likely related to changing sleeve for

below knee amputation.

3.  Diabetes.

4.  End-stage renal disease.

5.  Coronary artery disease.



PLAN:  The patient has been admitted.  Cardiology and Renal have been asked to 

see him in consultation as well as Vascular Surgery and the patient will be 

managed and treated appropriately.







BAA/VIS

DR: Cullen   DD: 02/08/2022 15:45

DT: 02/08/2022 17:31   TID: [id number]

## 2022-02-08 NOTE — PDOC
ANN PALACIO APRN 2/8/22 1155:


CARDIO Progress Notes


Date and Time


Date of Service


2/8/2022


Time of Evaluation


1140





Subjective


Subjective:  No Chest Pain, No shortness of breath, No Palpitations





Vitals


Vitals





Vital Signs








  Date Time  Temp Pulse Resp B/P (MAP) Pulse Ox O2 Delivery O2 Flow Rate FiO2


 


2/8/22 11:00 97.8 68 17 93/52 (66) 100 Room Air  





 97.8       








Weight


Weight [ ]





Input and Output


Intake and Output











Intake and Output 


 


 2/8/22





 07:00


 


Intake Total 240 ml


 


Output Total 0 ml


 


Balance 240 ml


 


 


 


Intake Oral 240 ml


 


Output Urine Total 0 ml











Laboratory


Labs





Laboratory Tests








Test


 2/7/22


13:51 2/7/22


14:00 2/7/22


16:41 2/7/22


18:11


 


Troponin I High Sensitivity 11 ng/L (4-75)   11 ng/L (4-75)  


 


SARS-CoV-2 RNA (AMADOU)


 


 Negative


(Negative) 


 





 


SARS-CoV-2 Antigen (Rapid)


 


 Negative


(NEGATIVE) 


 





 


Glucose (Fingerstick)


 


 


 


 118 mg/dL


(70-99)


 


Test


 2/7/22


20:44 2/8/22


04:30 2/8/22


08:00 2/8/22


11:35


 


Glucose (Fingerstick)


 267 mg/dL


(70-99) 


 175 mg/dL


(70-99) 277 mg/dL


(70-99)


 


White Blood Count


 


 6.0 x10^3/uL


(4.0-11.0) 


 





 


Red Blood Count


 


 2.30 x10^6/uL


(4.30-5.70) 


 





 


Hemoglobin


 


 7.1 g/dL


(13.0-17.5) 


 





 


Hematocrit


 


 21.7 %


(39.0-53.0) 


 





 


Mean Corpuscular Volume  94 fL ()   


 


Mean Corpuscular Hemoglobin  31 pg (25-35)   


 


Mean Corpuscular Hemoglobin


Concent 


 33 g/dL


(31-37) 


 





 


Red Cell Distribution Width


 


 14.5 %


(11.5-14.5) 


 





 


Platelet Count


 


 183 x10^3/uL


(140-400) 


 





 


Neutrophils (%) (Auto)  54 % (31-73)   


 


Lymphocytes (%) (Auto)  33 % (24-48)   


 


Monocytes (%) (Auto)  10 % (0-9)   


 


Eosinophils (%) (Auto)  3 % (0-3)   


 


Basophils (%) (Auto)  1 % (0-3)   


 


Neutrophils # (Auto)


 


 3.2 x10^3/uL


(1.8-7.7) 


 





 


Lymphocytes # (Auto)


 


 2.0 x10^3/uL


(1.0-4.8) 


 





 


Monocytes # (Auto)


 


 0.6 x10^3/uL


(0.0-1.1) 


 





 


Eosinophils # (Auto)


 


 0.2 x10^3/uL


(0.0-0.7) 


 





 


Basophils # (Auto)


 


 0.0 x10^3/uL


(0.0-0.2) 


 





 


Sodium Level


 


 142 mmol/L


(136-145) 


 





 


Potassium Level


 


 4.6 mmol/L


(3.5-5.1) 


 





 


Chloride Level


 


 100 mmol/L


() 


 





 


Carbon Dioxide Level


 


 30 mmol/L


(21-32) 


 





 


Anion Gap  12 (6-14)   


 


Blood Urea Nitrogen


 


 31 mg/dL


(8-26) 


 





 


Creatinine


 


 5.0 mg/dL


(0.7-1.3) 


 





 


Estimated GFR


(Cockcroft-Gault) 


 12.0 


 


 





 


Glucose Level


 


 164 mg/dL


(70-99) 


 





 


Calcium Level


 


 8.1 mg/dL


(8.5-10.1) 


 














Physical Exam


HEENT:  Neck Supple W Full Motion


Chest:  Symmetric


LUNGS:  Clear to Auscultation


Heart:  S1S2, RRR (SR)


Abdomen:  Soft N/T


Extremities:  No Calf Tenderness, Other (LBKA)


Neurology:  alert, oriented, follow commands





Assessment


Assessment


1.  Atypical CP: possibly GI. Recent echo with preserved LV systolic function


2.  CAD s/p CABG 2017. clinically stable 


3.  Left lower extremity stump pain: possibly from prosthetic inserts


4.  PAD s/p PTA RLE and left BKA. vascular following


5.  Hypertension; controlled 


6.  Hyperlipidemia; statin therapy 


7.  ESRD on HD


8.  Anemia of chronic disease 


9.  Murmur: recent TTE with mild AS and mild to mod MS 11/2021





Recommendations


Continue secondary prevention measures. DAPT


Fluid offloading via HD


Follow vascular recommendations


Outpatient ischemic evaluation, follow up as scheduled


Supportive care





Justicifation of Admission Dx:


Justifications for Admission:


Justification of Admission Dx:  Yes





TRACY BLAKELY MD 2/8/22 1953:


CARDIO Progress Notes


Assessment


Assessment


Patient seen and examined.  Agree with NP's assessment and plan.


Chest pain with atypical features.  Myocardial infarction has been ruled out.


CAD s/p CABG status clinically stable


We will consider ischemic evaluation as outpatient


Recent 2D echo showed normal LV systolic function


PAD stable -vascular surgery following


Continue hemodialysis per nephrology team











ANN PALACIO           Feb 8, 2022 11:55


TRACY BLAKELY MD            Feb 8, 2022 19:53

## 2022-02-08 NOTE — NUR
SW following. Discussed with RN, pt from home, room air, renal diet. Discharge order for 
home with self care. No SW needs identified.

## 2022-02-08 NOTE — DS
DATE OF DISCHARGE: 02/08/2022

PRIMARY DIAGNOSIS:  Noncardiac chest pain.



ADDITIONAL DIAGNOSES:

1.  Left below-the-knee amputation stump pain.

2.  End-stage renal disease, on dialysis.

3.  Anemia of chronic disease.

4.  Hypertension.

5.  Hyperlipidemia.

6.  Coronary artery disease.



CHIEF COMPLAINT AND HISTORY OF PRESENT ILLNESS:  This 56-year-old male admitted 

through the hospital with atypical chest pain as well as left stump pain, felt 

to possibly be ischemic by the ER.



SUMMARY OF STAY:  The patient was admitted.  Troponins were negative.  Vascular 

Surgery did not feel there was any ischemic issues with his stump, troponins 

again were negative.  Cardiology gave their blessing.  Renal saw him because of 

the ongoing dialysis.  It was felt that he could be dismissed to resume his 

regular home routine and work on the prosthesis new sleeve combination to see if

that was causing the problems which everyone felt that it probably was.



DISPOSITION:  The patient is discharged to home.  ADA Renal diet.  Activity as 

tolerated.  Office as scheduled.  Resume dialysis.



DISCHARGE MEDICATIONS:  Listed on the med rec and have been addressed and they 

are his regular home medications.







AD

DR: Cullen   DD: 02/08/2022 15:48

DT: 02/08/2022 19:11   TID: [id number]

## 2022-02-08 NOTE — NUR
Discharge Note:



[address]



Discharge instructions and discharge home medications reviewed with Patient and a copy 
given. All questions have been answered and understanding verbalized. 



The following instructions and handouts were given: follow up information for cardiology 
appointments, worsening symptoms, chest pain education, and medications.



Discontinued lines and drains: IV discontinued from Rt upper arm, telemetry removed and skin 
intact.



Patient discharged to home with self care via private vehicle.

## 2022-02-08 NOTE — PDOC
Renal-Progress Notes


Subjective Notes


Notes


PT SEEN THIS AM





History of Present Illness


Hx of present illness


NO ACUTE CHANGES. NO MORE PAIN





Vitals


Vitals





Vital Signs








  Date Time  Temp Pulse Resp B/P (MAP) Pulse Ox O2 Delivery O2 Flow Rate FiO2


 


2/8/22 11:00 97.8 68 17 93/52 (66) 100 Room Air  





 97.8       








Weight


Weight [ ]





I.O.


Intake and Output











Intake and Output 


 


 2/8/22





 06:59


 


Intake Total 240 ml


 


Output Total 0 ml


 


Balance 240 ml


 


 


 


Intake Oral 240 ml


 


Output Urine Total 0 ml











Labs


Labs





Laboratory Tests








Test


 2/7/22


16:41 2/7/22


18:11 2/7/22


20:44 2/8/22


04:30


 


Troponin I High Sensitivity 11 ng/L (4-75)    


 


Glucose (Fingerstick)


 


 118 mg/dL


(70-99) 267 mg/dL


(70-99) 





 


White Blood Count


 


 


 


 6.0 x10^3/uL


(4.0-11.0)


 


Red Blood Count


 


 


 


 2.30 x10^6/uL


(4.30-5.70)


 


Hemoglobin


 


 


 


 7.1 g/dL


(13.0-17.5)


 


Hematocrit


 


 


 


 21.7 %


(39.0-53.0)


 


Mean Corpuscular Volume    94 fL () 


 


Mean Corpuscular Hemoglobin    31 pg (25-35) 


 


Mean Corpuscular Hemoglobin


Concent 


 


 


 33 g/dL


(31-37)


 


Red Cell Distribution Width


 


 


 


 14.5 %


(11.5-14.5)


 


Platelet Count


 


 


 


 183 x10^3/uL


(140-400)


 


Neutrophils (%) (Auto)    54 % (31-73) 


 


Lymphocytes (%) (Auto)    33 % (24-48) 


 


Monocytes (%) (Auto)    10 % (0-9) 


 


Eosinophils (%) (Auto)    3 % (0-3) 


 


Basophils (%) (Auto)    1 % (0-3) 


 


Neutrophils # (Auto)


 


 


 


 3.2 x10^3/uL


(1.8-7.7)


 


Lymphocytes # (Auto)


 


 


 


 2.0 x10^3/uL


(1.0-4.8)


 


Monocytes # (Auto)


 


 


 


 0.6 x10^3/uL


(0.0-1.1)


 


Eosinophils # (Auto)


 


 


 


 0.2 x10^3/uL


(0.0-0.7)


 


Basophils # (Auto)


 


 


 


 0.0 x10^3/uL


(0.0-0.2)


 


Sodium Level


 


 


 


 142 mmol/L


(136-145)


 


Potassium Level


 


 


 


 4.6 mmol/L


(3.5-5.1)


 


Chloride Level


 


 


 


 100 mmol/L


()


 


Carbon Dioxide Level


 


 


 


 30 mmol/L


(21-32)


 


Anion Gap    12 (6-14) 


 


Blood Urea Nitrogen


 


 


 


 31 mg/dL


(8-26)


 


Creatinine


 


 


 


 5.0 mg/dL


(0.7-1.3)


 


Estimated GFR


(Cockcroft-Gault) 


 


 


 12.0 





 


Glucose Level


 


 


 


 164 mg/dL


(70-99)


 


Calcium Level


 


 


 


 8.1 mg/dL


(8.5-10.1)


 


Test


 2/8/22


08:00 2/8/22


11:35 


 





 


Glucose (Fingerstick)


 175 mg/dL


(70-99) 277 mg/dL


(70-99) 


 














Review of Systems


Constitutional:  yes: weakness, alert, oriented


Ears/Nose/Throat:  Yes: no symptom reported


Eyes:  Yes: no symptom reported


Pulmonary:  Yes no symptom reported


Cardiovascular:  Yes no symptom reported


Gastrointestional:  Yes: no symptom reported


Genitourinary:  Yes: no symptom reported


Musculoskeletal:  Yes: no symptom reported


Skin:  Yes no symptom reported


Psychiatric/Neurological:  Yes: no symptom reported





Physical Exam


General Appearance:  no apparent distress


Skin:  warm


Respiratory:  bilateral CTA


Heart:  S1S2


Abdomen:  soft


Genitourinary:  bladder flat


Extremities:  atrophy


Neurology:  alert, oriented, follow commands





Assessment


Assessment


IMP





CHEST PAIN-ATYPICAL PER CARDIOLOGY


ESRD MWF-L FA AVF


ANEMIA


LEFT BKA STUMP PAIN


DM II


HTN





PLAN





D/C PLANS NOTED


PT TO BE DISCHARGED 


HAVE ASKED HIM TO FOLLOW UP AT [facility] TOMORROW











RACHEAL GUERRERO MD                  Feb 8, 2022 14:19

## 2022-03-15 ENCOUNTER — HOSPITAL ENCOUNTER (OUTPATIENT)
Dept: HOSPITAL 61 - NM | Age: 57
End: 2022-03-15
Attending: INTERNAL MEDICINE
Payer: COMMERCIAL

## 2022-03-15 DIAGNOSIS — R07.89: Primary | ICD-10-CM

## 2022-03-15 PROCEDURE — 93017 CV STRESS TEST TRACING ONLY: CPT

## 2022-03-15 PROCEDURE — 78452 HT MUSCLE IMAGE SPECT MULT: CPT

## 2022-03-15 PROCEDURE — A9500 TC99M SESTAMIBI: HCPCS

## 2022-03-16 NOTE — RAD
MR#: [id number]

Account#: [id number]

Accession#: [id number]

Date of Study: 03/15/2022

Ordering Physician: TRACY BLAKELY, 

Referring Physician: MUSA RODRIGUEZ Tech: SHERMAN Elizalde, ARRT (R) (N)





--------------- APPROVED REPORT --------------





Test Type:          Pharmacological

Stress Nurse/Tech: Kelly Patrick R.N.

Test Indications: chest pain

Cardiac History: CABG 3 vessel-3 yr ago, htn, dialysis,dm

Medications:     see ehr

Medical History: see ehr

Resting ECG:     sr see printout

Resting Heart Rate: 64 bpm

Resting Blood Pressure: 91/53mmHg

Pretest Chest Pain: No chest pain



Nurse/Tech Notes

lungs cta, heart tones regular

Before starting procedure, BP noted to be 91/53, pt appears lethargic and O2 sat 89%, repeat BP 73/43
. decision made to give NS 250cc bolus. Repeat BPs 101/52 94/52 94/55. After initial bolus, spoke to 
Dr Blakely-order received to give 2nd 250cc bolus and complete Lexiscan stress. Beginning pressure f
or stress 96/55

Consent: The procedure was explained to the patient in lay terms. Informed consent was witnessed. He
eout was entered into [employer]. History and Stress Test performed by RT Adolph (R) (N)



Pharm. Details

Pharmacologic stress testing was performed using 0.4mg per 5ml of regadenoson given intravenously ove
r 7-10 seconds.



Stress Symptoms

No chest pain or symptoms.



POST EXERCISE

Reason for Termination: Infusion complete

Target HR: No

Max HR: 77 bpm

Max Blood Pressure: 95/56mmHg

Chest Pain: No. 

Arrhythmia: No. 

ST Change: No. 



INTERPRETATION

Stress EKG Conclusion: Baseline EKG showed sinus rhythm.  No ischemic changes at peak stress.  No arr
hythmias.



Imaging Protocol

IMAGE PROTOCOL: Rest Tc-99m/stress Tc-99m 1 day



Rest:            Stress:         Viability:   

Radiopharm.Tc99m [id number] Sestamibi

Dose10.1mCi            31.2mCi            

Img Date  03/15/2022      03/15/2022      

Inj-Img [id number].           60min.           



Rest Admin Site:IV - Right HandAdministrator:Tai Clark, RT (R)(N)

Stress Admin Site: IV - Right HandAdministrator: Lakeisha Walters, RT (R)(N)



STRESS DATA

End Diast. Vol.90.0mlLVEDV index BSA42.0ml

End Syst. Vol.23.0mlLVESV index BSA11.0ml

Myocardial Niut827.0gEject. Xxieuemm48.0%



Stress Scores

Regional WT0.00Summed WT2.00

Regional WM0.00Summed WM12.00



Study quality was good.  Left Ventricular size was Normal at Rest and Stress.

Lung uptake was .  Left Ventricular ejection fraction is 70%.

The rest and stress images show normal perfusion, normal contraction and thickening.



LV Perf. Quant

17 Seg. SSS1.00

17 Seg. SRS6.00

17 Seg. SDS0.00

Stress Defect Extent (% LAD)0.00Rest Defect Extent (% LAD)0.00Rev. Defect Extent (% LAD)0.00

Stress Defect Extent (% LCX) 0.00Rest Defect Extent (% LCX)42.50Rev. Defect Extent (% LCX)0.00

Stress Defect Extent (% RCA)0.00Rest Defect Extent (% RCA)0.00Rev. Defect Extent (% RCA)0.00

Stress Defect Extent (% NICOLE)0.00Rest Defect Extent (% NICOLE)8.30Rev. Defect Extent (% NICOLE)0.00



Conclusion

1. Regadenoson cardioisotope stress test did not show any evidence of ischemia or infarct.

2. Normal left ventricular systolic function with ejection fraction calculated at 70%.

3. Low risk for cardiac events.



Signed by : Tracy Blakely, 

Electronically Approved : 03/16/2022 07:45:48